# Patient Record
Sex: FEMALE | Race: WHITE | Employment: UNEMPLOYED | URBAN - METROPOLITAN AREA
[De-identification: names, ages, dates, MRNs, and addresses within clinical notes are randomized per-mention and may not be internally consistent; named-entity substitution may affect disease eponyms.]

---

## 2017-11-13 ENCOUNTER — INITIAL PRENATAL (OUTPATIENT)
Dept: MIDWIFE SERVICES | Age: 32
End: 2017-11-13

## 2017-11-13 VITALS
BODY MASS INDEX: 22.59 KG/M2 | DIASTOLIC BLOOD PRESSURE: 81 MMHG | WEIGHT: 132.31 LBS | SYSTOLIC BLOOD PRESSURE: 128 MMHG | HEIGHT: 64 IN | HEART RATE: 106 BPM

## 2017-11-13 DIAGNOSIS — O20.0 THREATENED MISCARRIAGE IN EARLY PREGNANCY: ICD-10-CM

## 2017-11-13 DIAGNOSIS — Z34.01 ENCOUNTER FOR PRENATAL CARE IN FIRST TRIMESTER OF FIRST PREGNANCY: Primary | ICD-10-CM

## 2017-11-13 LAB
BILIRUB UR QL STRIP: NEGATIVE
GLUCOSE UR-MCNC: NEGATIVE MG/DL
HCG URINE, QL. (POC): POSITIVE
KETONES P FAST UR STRIP-MCNC: NEGATIVE MG/DL
PH UR STRIP: 6.5 [PH] (ref 4.6–8)
PROT UR QL STRIP: NEGATIVE
SP GR UR STRIP: 1 (ref 1–1.03)
UA UROBILINOGEN AMB POC: NORMAL (ref 0.2–1)
URINALYSIS CLARITY POC: CLEAR
URINALYSIS COLOR POC: YELLOW
URINE BLOOD POC: NEGATIVE
URINE LEUKOCYTES POC: NEGATIVE
URINE NITRITES POC: NEGATIVE
VALID INTERNAL CONTROL?: YES

## 2017-11-13 NOTE — PROGRESS NOTES
Subjective:     Jeanette Gutierrez is being seen today for her first obstetrical visit. This is a planned pregnancy. Her LMP was 9/5/2017. With an EDC of  6/12/2018  Her obstetrical history is significant for Cholestasis of pregnancy x 2. Her medical history is significant for hearing loss starting age 22, wears bilateral hearing aids x 2. Her current medications include: Prenatal vitamins. She is here today unaccompanied  She is seeking midwifery care  Denies travel to [facility] affected area. Last pap 2/14, due 2/19, since co-testing negative    History fully reviewed- see chart    See patient intake form for complete ROS, scanned in pt chart, under media tab. ROS negative other than c/o rash on back, unable to visualize on exam. Also c/o left lower back pain with urination. No CVAT on exam.    Objective:     Refer to Prenatal Flowsheet and Physical for exam findings. Fundal Height not appreciated. The patient appears well, alert, oriented x 3, in no distress. Visit Vitals    /81    Pulse (!) 106    Ht 5' 4\" (1.626 m)    Wt 132 lb 5 oz (60 kg)    LMP 09/05/2017 (Exact Date)    Breastfeeding No    BMI 22.71 kg/m2     Neck supple. No adenopathy or thyromegaly. Lungs are clear, good air entry, no wheezes, rhonchi or rales. S1 and S2 normal, no murmurs, regular rate and rhythm. Abdomen soft without tenderness, guarding, mass or organomegaly. Extremities show no edema. Neurological is normal, no focal findings.     BREAST EXAM: breasts appear normal, no suspicious masses, no skin or nipple changes or axillary nodes    PELVIC EXAM: normal external genitalia, vulva, vagina, cervix, uterus and adnexa   Pap smear collected  Transvaginal ultrasound    Indication for this exam:  Dating ultrasound and confirmation of viability      Findings  Number of Fetuses: double ring sign  No cardiac flicker   Possible early yolk sac without fetal pole  Maternal ovaries appear normal  No fluid in cul-de-sac  LNMP: 9/5/2017 GA by LMP: 9+5 weeks  CRL: none noted    Summary  Estimated GA: unable to determine   Early pregnancy vs blighted ovum vs missed AB      Sonographer: VIOLETTE Cordero CNM      Assessment:     Early pregnancy vs blighted ovum vs missed AB      Plan:     Labs: New OB lab slip given but will hold pending confirmation of viable pregnancy  Pregnancy test done and positive  Quant hcg now and repeat in 48 hours  Pap current  Continue prenatal vitamins. Genetic screening options reviewed. At this time the patient declines all testing  Midwifery care/philosophy discussed including MD collaboration. Ectopic precautions stressed. Follow-up in 2 weeks for f/u ultrasound if quant HCG rising appropriate suggestive of viable pregnancy. Patient informed we will notify her of HCG results.

## 2017-11-13 NOTE — PROGRESS NOTES
Chief Complaint   Patient presents with    Pregnancy     New pregnancy     Visit Vitals    /81    Pulse (!) 106    Ht 5' 4\" (1.626 m)    Wt 132 lb 5 oz (60 kg)    LMP 09/05/2017 (Exact Date)    Breastfeeding No    BMI 22.71 kg/m2     1. Have you been to the ER, urgent care clinic since your last visit? Hospitalized since your last visit? No    2. Have you seen or consulted any other health care providers outside of the [address] [place] [address] since your last visit? Include any pap smears or colon screening.  No     Verbal order for urine culture and GC/Ct per lizz Cordero CNM

## 2017-11-13 NOTE — PATIENT INSTRUCTIONS
Thank you for choosing [facility]. We know you have many options when it comes to your healthcare; we appreciate that you picked us. Our goal is to provide exceptional service and world class care for every patient. You may receive a survey in the mail or by email asking for your feedback. Please take a few minutes to share your thoughts about your recent visit. Your comments helps us understand what we do well and what we can do better. To ensure confidentiality, this survey is administered by an independent third-party, [address] [place] [address] participation will help us to continue and improve the quality of care that we provide to you, your family, friends, and neighbors. Thank you! Weeks 10 to 14 of Your Pregnancy: Care Instructions  Your Care Instructions    By weeks 10 to 14 of your pregnancy, the placenta has formed inside your uterus. It is possible to hear your baby's heartbeat with a special ultrasound device. Your baby's eyes can and do move. The arms and legs can bend. This is a good time to think about testing for birth defects. There are two types of tests: screening and diagnostic. Screening tests show the chance that a baby has a certain birth defect. They can't tell you for sure that your baby has a problem. Diagnostic tests show if a baby has a certain birth defect. It's your choice whether to have these tests. You and your partner can talk to your doctor or midwife about birth defects tests. Follow-up care is a key part of your treatment and safety. Be sure to make and go to all appointments, and call your doctor if you are having problems. It's also a good idea to know your test results and keep a list of the medicines you take. How can you care for yourself at home? Decide about tests  · You can have screening tests and diagnostic tests to check for birth defects.  The decision to have a test for birth defects is personal. Think about your age, your chance of passing on a family disease, your need to know about any problems, and what you might do after you have the test results. ¨ Triple or quadruple (quad) blood tests. These screening tests can be done between 15 and 20 weeks of pregnancy. They check the amounts of three or four substances in your blood. The doctor looks at these test results, along with your age and other factors, to find out the chance that your baby may have certain problems. ¨ Amniocentesis. This diagnostic test is used to look for chromosomal problems in the baby's cells. It can be done between 15 and 20 weeks of pregnancy, usually around week 16.  ¨ Nuchal translucency test. This test uses ultrasound to measure the thickness of the area at the back of the baby's neck. An increase in the thickness can be an early sign of Down syndrome. ¨ Chorionic villus sampling (CVS). This is a test that looks for certain genetic problems with your baby. The same genes that are in your baby are in the placenta. A small piece of the placenta is taken out and tested. This test is done when you are 10 to 13 weeks pregnant. Ease discomfort  · Slow down and take naps when you feel tired. · If your emotions swing, talk to someone. Crying, anxiety, and concentration problems are common. · If your gums bleed, try a softer toothbrush. If your gums are puffy and bleed a lot, see your dentist.  · If you feel dizzy:  ¨ Get up slowly after sitting or lying down. ¨ Drink plenty of fluids. ¨ Eat small snacks to keep your blood sugar stable. ¨ Put your head between your legs as though you were tying your shoelaces. ¨ Lie down with your legs higher than your head. Use pillows to prop up your feet. · If you have a headache:  ¨ Lie down. ¨ Ask your partner or a good friend for a neck massage. ¨ Try cool cloths over your forehead or across the back of your neck. ¨ Use acetaminophen (Tylenol) for pain relief.  Do not use nonsteroidal anti-inflammatory drugs (NSAIDs), such as ibuprofen (Advil, Motrin) or naproxen (Aleve), unless your doctor says it is okay. · If you have a nosebleed, pinch your nose gently, and hold it for a short while. To prevent nosebleeds, try massaging a small dab of petroleum jelly, such as Vaseline, in your nostrils. · If your nose is stuffed up, try saline (saltwater) nose sprays. Do not use decongestant sprays. Care for your breasts  · Wear a bra that gives you good support. · Know that changes in your breasts are normal.  ¨ Your breasts may get larger and more tender. Tenderness usually gets better by 12 weeks. ¨ Your nipples may get darker and larger, and small bumps around your nipples may show more. ¨ The veins in your chest and breasts may show more. · Don't worry about \"toughening'\" your nipples. Breastfeeding will naturally do this. Where can you learn more? Go to http://mo-mykel.info/. Enter Y746 in the search box to learn more about \"Weeks 10 to 14 of Your Pregnancy: Care Instructions. \"  Current as of: March 16, 2017  Content Version: 11.4  © 2933-6032 [employer]. Care instructions adapted under license by [employer] (which disclaims liability or warranty for this information). If you have questions about a medical condition or this instruction, always ask your healthcare professional. [place] [address] any warranty or liability for your use of this information.

## 2017-11-14 LAB — HCG INTACT+B SERPL-ACNC: NORMAL MIU/ML

## 2017-11-15 DIAGNOSIS — O20.0 THREATENED MISCARRIAGE IN EARLY PREGNANCY: ICD-10-CM

## 2017-11-16 LAB — HCG INTACT+B SERPL-ACNC: NORMAL MIU/ML

## 2017-12-01 ENCOUNTER — TELEPHONE (OUTPATIENT)
Dept: MIDWIFE SERVICES | Age: 32
End: 2017-12-01

## 2017-12-01 NOTE — TELEPHONE ENCOUNTER
Kaity Hunter was informed of ultrasound results completed on yesterday which confirms non-viable intrauterine pregnancy with early gestation with no fetal pole. She has had no cramping or bleeding. Options reviewed and at this time she elects for expectant management and wait to pass the products. She was advised to expect some cramping and bleeding and to go to the ER for significant bleeding. She can take Motrin for the pain. She is Rh positive. She is to call the office if she would like a D and C or other options and for follow up for post SAB care. She verbalized understanding.

## 2017-12-01 NOTE — TELEPHONE ENCOUNTER
Pt had a repeat dating u/s due to no confirmed fetal pole, abnormal rising quants at [facility] yesterday 11/30/2017 and she would like to discuss results before the weekend. Please call.

## 2017-12-12 ENCOUNTER — TELEPHONE (OUTPATIENT)
Dept: MIDWIFE SERVICES | Age: 32
End: 2017-12-12

## 2017-12-12 NOTE — TELEPHONE ENCOUNTER
Pt calling stating she would like to update Mable Cushing on her miscarriage and she would like to discuss a few questions on having a \"natural miscarriage\". Please call.

## 2017-12-13 DIAGNOSIS — O02.1 MISSED ABORTION WITH FETAL DEMISE BEFORE 20 COMPLETED WEEKS OF GESTATION: Primary | ICD-10-CM

## 2017-12-13 RX ORDER — MISOPROSTOL 200 UG/1
TABLET ORAL
Qty: 10 TAB | Refills: 0 | Status: ON HOLD | OUTPATIENT
Start: 2017-12-13 | End: 2017-12-15 | Stop reason: ALTCHOICE

## 2017-12-13 NOTE — TELEPHONE ENCOUNTER
Spoke with Kayleen this morning. Has been having some spotting. Options reviewed and will order Misoprostol 1000 mcg orally x1 to repeat x1 in 24 hours if no POC passed. Offered Percocet for pain and declined. Will take Motrin 800 mg PO for pain PRN.

## 2017-12-13 NOTE — PROGRESS NOTES
Attempted to call Gama Nunez at listed number to return phone call and got voice mail. Message left to have her call me at my cell phone number. Will await call.

## 2017-12-14 ENCOUNTER — APPOINTMENT (OUTPATIENT)
Dept: ULTRASOUND IMAGING | Age: 32
End: 2017-12-14
Attending: EMERGENCY MEDICINE
Payer: SUBSIDIZED

## 2017-12-14 ENCOUNTER — HOSPITAL ENCOUNTER (OUTPATIENT)
Age: 32
Setting detail: OBSERVATION
Discharge: HOME | End: 2017-12-15
Attending: EMERGENCY MEDICINE | Admitting: OBSTETRICS & GYNECOLOGY
Payer: SUBSIDIZED

## 2017-12-14 DIAGNOSIS — R55 SYNCOPE AND COLLAPSE: ICD-10-CM

## 2017-12-14 DIAGNOSIS — O03.9 COMPLETE MISCARRIAGE: Primary | ICD-10-CM

## 2017-12-14 DIAGNOSIS — N93.9 VAGINAL BLEEDING: ICD-10-CM

## 2017-12-14 LAB
BASOPHILS # BLD: 0 K/UL (ref 0–0.1)
BASOPHILS # BLD: 0 K/UL (ref 0–0.1)
BASOPHILS NFR BLD: 0 % (ref 0–1)
BASOPHILS NFR BLD: 0 % (ref 0–1)
EOSINOPHIL # BLD: 0 K/UL (ref 0–0.4)
EOSINOPHIL # BLD: 0.2 K/UL (ref 0–0.4)
EOSINOPHIL NFR BLD: 0 % (ref 0–7)
EOSINOPHIL NFR BLD: 1 % (ref 0–7)
ERYTHROCYTE [DISTWIDTH] IN BLOOD BY AUTOMATED COUNT: 12.8 % (ref 11.5–14.5)
ERYTHROCYTE [DISTWIDTH] IN BLOOD BY AUTOMATED COUNT: 13 % (ref 11.5–14.5)
GLUCOSE BLD STRIP.AUTO-MCNC: 120 MG/DL (ref 65–100)
HCT VFR BLD AUTO: 26.6 % (ref 35–47)
HCT VFR BLD AUTO: 34.6 % (ref 35–47)
HGB BLD-MCNC: 11.9 G/DL (ref 11.5–16)
HGB BLD-MCNC: 8.9 G/DL (ref 11.5–16)
LYMPHOCYTES # BLD: 1.1 K/UL (ref 0.8–3.5)
LYMPHOCYTES # BLD: 2.6 K/UL (ref 0.8–3.5)
LYMPHOCYTES NFR BLD: 22 % (ref 12–49)
LYMPHOCYTES NFR BLD: 9 % (ref 12–49)
MCH RBC QN AUTO: 30 PG (ref 26–34)
MCH RBC QN AUTO: 30.2 PG (ref 26–34)
MCHC RBC AUTO-ENTMCNC: 33.5 G/DL (ref 30–36.5)
MCHC RBC AUTO-ENTMCNC: 34.4 G/DL (ref 30–36.5)
MCV RBC AUTO: 87.2 FL (ref 80–99)
MCV RBC AUTO: 90.2 FL (ref 80–99)
MONOCYTES # BLD: 0.6 K/UL (ref 0–1)
MONOCYTES # BLD: 0.8 K/UL (ref 0–1)
MONOCYTES NFR BLD: 5 % (ref 5–13)
MONOCYTES NFR BLD: 7 % (ref 5–13)
NEUTS SEG # BLD: 10.3 K/UL (ref 1.8–8)
NEUTS SEG # BLD: 8.4 K/UL (ref 1.8–8)
NEUTS SEG NFR BLD: 70 % (ref 32–75)
NEUTS SEG NFR BLD: 86 % (ref 32–75)
PLATELET # BLD AUTO: 178 K/UL (ref 150–400)
PLATELET # BLD AUTO: 274 K/UL (ref 150–400)
RBC # BLD AUTO: 2.95 M/UL (ref 3.8–5.2)
RBC # BLD AUTO: 3.97 M/UL (ref 3.8–5.2)
SERVICE CMNT-IMP: ABNORMAL
WBC # BLD AUTO: 12 K/UL (ref 3.6–11)
WBC # BLD AUTO: 12.1 K/UL (ref 3.6–11)

## 2017-12-14 PROCEDURE — 74011250636 HC RX REV CODE- 250/636: Performed by: EMERGENCY MEDICINE

## 2017-12-14 PROCEDURE — 99218 HC RM OBSERVATION: CPT

## 2017-12-14 PROCEDURE — 85025 COMPLETE CBC W/AUTO DIFF WBC: CPT | Performed by: EMERGENCY MEDICINE

## 2017-12-14 PROCEDURE — 74011250637 HC RX REV CODE- 250/637: Performed by: OBSTETRICS & GYNECOLOGY

## 2017-12-14 PROCEDURE — 99285 EMERGENCY DEPT VISIT HI MDM: CPT

## 2017-12-14 PROCEDURE — 82962 GLUCOSE BLOOD TEST: CPT

## 2017-12-14 PROCEDURE — 96361 HYDRATE IV INFUSION ADD-ON: CPT

## 2017-12-14 PROCEDURE — 36415 COLL VENOUS BLD VENIPUNCTURE: CPT | Performed by: EMERGENCY MEDICINE

## 2017-12-14 PROCEDURE — 76801 OB US < 14 WKS SINGLE FETUS: CPT

## 2017-12-14 PROCEDURE — 96360 HYDRATION IV INFUSION INIT: CPT

## 2017-12-14 PROCEDURE — 76817 TRANSVAGINAL US OBSTETRIC: CPT

## 2017-12-14 RX ORDER — ACETAMINOPHEN 325 MG/1
650 TABLET ORAL
Status: DISCONTINUED | OUTPATIENT
Start: 2017-12-14 | End: 2017-12-15 | Stop reason: HOSPADM

## 2017-12-14 RX ORDER — ZOLPIDEM TARTRATE 5 MG/1
5 TABLET ORAL
Status: DISCONTINUED | OUTPATIENT
Start: 2017-12-14 | End: 2017-12-15 | Stop reason: HOSPADM

## 2017-12-14 RX ORDER — SODIUM CHLORIDE 9 MG/ML
125 INJECTION, SOLUTION INTRAVENOUS CONTINUOUS
Status: DISCONTINUED | OUTPATIENT
Start: 2017-12-14 | End: 2017-12-15 | Stop reason: HOSPADM

## 2017-12-14 RX ORDER — IBUPROFEN 200 MG
400 TABLET ORAL
COMMUNITY
End: 2018-09-12 | Stop reason: ALTCHOICE

## 2017-12-14 RX ADMIN — SODIUM CHLORIDE 125 ML/HR: 900 INJECTION, SOLUTION INTRAVENOUS at 16:42

## 2017-12-14 RX ADMIN — ACETAMINOPHEN 650 MG: 325 TABLET ORAL at 17:00

## 2017-12-14 RX ADMIN — SODIUM CHLORIDE 1000 ML: 900 INJECTION, SOLUTION INTRAVENOUS at 11:16

## 2017-12-14 RX ADMIN — SODIUM CHLORIDE 1000 ML: 900 INJECTION, SOLUTION INTRAVENOUS at 14:02

## 2017-12-14 NOTE — ED NOTES
Pt passed out again and failed orthostatic vital signs. Pt continues to vaginally bleed. Dr. Eric Muhammad told.

## 2017-12-14 NOTE — ED NOTES
Pt given comfort care miscarriage purple folder. Pt resting in room, vs stable as noted. Pt has no new complaints at this time.

## 2017-12-14 NOTE — ED NOTES
Pt resting in room, pt alert and oriented x3, pt reports she is having abdominal cramping at this time, vs satble as noted, pt has no new complaints at this time. Call bell within reach, bed in low position and locked. Pt aware to call the RN on the call bell if needs anything. Pt verbalizes understanding.

## 2017-12-14 NOTE — H&P
Gynecology History and Physical    Name: Kenyatta Hughes MRN: [id number] SSN: [id number]    YOB: 1985  Age: 27 y.o. Sex: female       Subjective:      Chief complaint:  Vaginal bleeding, ARI Jones is a 27 y.o.  female with a history of missed AB/blighted ovum. Took cytotec 1000mcg last PM.  Bleeding started overnight. Heavy bleeding this AM around 0800. Was passing lemon-sized clots.  +cramping. Seen by Dr. Paul Puente in ER - evacuated blood and tissue. Bleeding has decreased significantly since then. US done, no retained POCs (just thickened stripe). Orthostatic with syncopal episode in ER. Sitting up in bed now, feeling a little better. Bleeding has decreased.     OB History      Para Term  AB Living    5 3 3  1 3    SAB TAB Ectopic Molar Multiple Live Births    0     3        Past Medical History:   Diagnosis Date    Cholestasis of pregnancy 2014    Cholestasis of pregnancy 2012    Hearing loss 2004    BILATERAL GENETIC HISTORY     Vaginal delivery 2014     Past Surgical History:   Procedure Laterality Date    HX WISDOM TEETH EXTRACTION      as a teenager    INNER EAR SURGERY [address] UNLISTED      titaneum stapes d/t hearing loss     Social History     Occupational History    stay at home mom      Social History Main Topics    Smoking status: Never Smoker    Smokeless tobacco: Never Used    Alcohol use No    Drug use: No    Sexual activity: Yes     Partners: Male     Family History   Problem Relation Age of Onset    Other Mother      BILATERAL HEARING LOSS    Cancer Father      skin cancer    Dementia Maternal Grandmother     Other Maternal Grandfather      SKIN CA    Other Other      2nd pregnancy at 36 weeks    Breast Cancer Paternal [de-identified]     Other Sister      appendectomy    Colon Cancer Neg Hx     Ovarian Cancer Neg Hx     Diabetes Neg Hx     Heart Disease Neg Hx     Stroke Neg Hx         No Known Allergies  Prior to Admission medications    Medication Sig Start Date End Date Taking? Authorizing Provider   miSOPROStol (CYTOTEC) 200 mcg tablet Take 5 tablets at one time, repeat in 24 hours if no results. 12/13/17   Avila Cordero CNM   prenatal vit-iron fumarate-fa 27-0.8 mg Tab tablet Take 1 Tab by mouth daily. Historical Provider        Review of Systems:  A comprehensive review of systems was negative except for that written in the History of Present Illness. Objective:     Vitals:    12/14/17 1445 12/14/17 1500 12/14/17 1515 12/14/17 1530   BP: 113/54 108/57 112/57 109/57   Pulse:   (!) 101 98   Resp: 15 (!) 31 22 18   Temp:       SpO2: 100% 100% 100% 100%   Weight:       Height:           Physical Exam:  Patient without distress. Heart: S1S2 present or slighlty tachycardic (GU=[id number])  Lung: clear to auscultation throughout lung fields, no wheezes, no rales, no rhonchi and normal respiratory effort  Abdomen: soft, nontender  Scant blood on pad (changed ~15min ago)    Assessment:     SAB with vaginal bleeding. Blood and tissue evacuated, bleeding has decreased. Syncopal episode  hgb 11.9 -> 8.9    Plan:         Feel that she has passed POCs. Will observe for the next few hours. If bleeding not excessive and hemodynamically stable, then OK for discharge home later this evening. Has f/u scheduled 12/26 with Samina Maxwell CNM. I will contact her - if pt doing well can f/u with her or with me. Will f/u with me if any other concerns. Will leave NPO except meds for now.       Signed By:  [address], MD     December 14, 2017

## 2017-12-14 NOTE — ED NOTES
Spoke with Dr. Tavia Martinez and due to patient not having any specific tissue expelled, no record of death form will be filled out or specimen sent to lab. Charge RN Hans Melendez aware of situation.

## 2017-12-14 NOTE — ED NOTES
Pt has been passing blood clots. Dr. Leidy Rosen assessed blood clots and no orders to send any clots to lab at this time.

## 2017-12-14 NOTE — IP AVS SNAPSHOT
Summary of Care Report The Summary of Care report has been created to help improve care coordination. Users with access to [employer] or [address] (Web-based application) may access additional patient information including the Discharge Summary. If you are not currently a [address] user and need more information, please call the number listed below in the [place] [address] section and ask to be connected with Medical Records. Facility Information Name Address Phone [address] [place] [id number] [phone] Patient Information Patient Name Sex  Ravinder Silveira ([id number]) Female 1985 Discharge Information Admitting Provider Service Area Unit Marilyn Abernathy MD / [phone] [place]  [phone] Discharge Provider Discharge Date/Time Discharge Disposition Destination (none) 12/15/2017 (Pending) AHR (none) Patient Language Language ENGLISH [13] Hospital Problems as of 12/15/2017  Reviewed: 2017  2:11 PM by Trey Tierney CNM Class Noted - Resolved Last Modified POA Active Problems Syncope and collapse  2017 - Present 2017 by Junito Sood MD Unknown Entered by Junito Sood MD  
  Vaginal bleeding  2017 - Present 2017 by Junito Sood MD Unknown Entered by Junito Sood MD  
  
Non-Hospital Problems as of 12/15/2017  Reviewed: 2017  2:11 PM by Trey Tierney CNM Class Noted - Resolved Last Modified Active Problems Threatened  in early pregnancy  2017 - Present 2017 by Trey Tierney CNM Entered by Trey Tierney CNM You are allergic to the following No active allergies Current Discharge Medication List  
  
CONTINUE these medications which have NOT CHANGED Dose & Instructions Dispensing Information Comments  
 ibuprofen 200 mg tablet Commonly known as:  MOTRIN Dose:  400 mg Take 400 mg by mouth every six (6) hours as needed for Pain. Refills:  0 STOP taking these medications Comments  
 miSOPROStol 200 mcg tablet Commonly known as:  CYTOTEC Current Immunizations Name Date Influenza Vaccine PF 1/15/2014 Influenza Vaccine Split 10/25/2011 Tdap 1/15/2014 Follow-up Information Follow up With Details Comments Contact Info None   None (395) Patient stated that they have no PCP Discharge Instructions Miscarriage: Care Instructions Your Care Instructions The loss of a pregnancy can be very hard. You may wonder why it happened or blame yourself. Miscarriages are common and are not caused by exercise, stress, or sex. Most happen because the fertilized egg in the uterus does not develop normally. There is no treatment that can stop a miscarriage. As long as you do not have heavy blood loss, fever, weakness, or other signs of infection, you can let a miscarriage follow its own course. This can take several days. Your body will recover over the next several weeks. Having a miscarriage does not mean you cannot have a normal pregnancy in the future. The doctor has checked you carefully, but problems can develop later. If you notice any problems or new symptoms, get medical treatment right away. Follow-up care is a key part of your treatment and safety. Be sure to make and go to all appointments, and call your doctor if you are having problems. It's also a good idea to know your test results and keep a list of the medicines you take. How can you care for yourself at home? · You will probably have some vaginal bleeding for 1 to 2 weeks.  It may be similar to or slightly heavier than a normal period. The bleeding should get lighter after a week. Use pads instead of tampons. You may use tampons during your next period, which should start in 3 to 6 weeks. · Take an over-the-counter pain medicine, such as acetaminophen (Tylenol), ibuprofen (Advil, Motrin), or naproxen (Aleve) for cramps. Read and follow all instructions on the label. You may have cramps for several days after the miscarriage. · Do not take two or more pain medicines at the same time unless the doctor told you to. Many pain medicines have acetaminophen, which is Tylenol. Too much acetaminophen (Tylenol) can be harmful. · Use a clear container to save any tissue that you pass. Take it to your doctor's office as soon as you can. · Do not have sex until the bleeding stops. · You may return to your normal activities if you feel well enough to do so. But you should avoid heavy exercise until the bleeding stops. · If you plan to get pregnant again, check with your doctor. Most doctors suggest waiting until you have had at least one normal period before you try to get pregnant. · If you do not want to get pregnant, ask your doctor about birth control. You can get pregnant again before your next period starts if you are not using birth control. · You may be low in iron because of blood loss. Eat a balanced diet that is high in iron and vitamin C. Foods rich in iron include red meat, shellfish, eggs, beans, and leafy green vegetables. Foods high in vitamin C include citrus fruits, tomatoes, and broccoli. Talk to your doctor about whether you need to take iron pills or a multivitamin. · The loss of a pregnancy can be very hard. You may wonder why it happened and blame yourself. Talking to family members, friends, a counselor, or your doctor may help you cope with your loss. When should you call for help? Call 911 anytime you think you may need emergency care. For example, call if: ? · You passed out (lost consciousness). ?Call your doctor now or seek immediate medical care if: 
? · You have severe vaginal bleeding. ? · You are dizzy or lightheaded, or you feel like you may faint. ? · You have new or worse pain in your belly or pelvis. ? · You have a fever. ? · You have vaginal discharge that smells bad. ? Watch closely for changes in your health, and be sure to contact your doctor if: 
? · You do not get better as expected. Where can you learn more? Go to http://mo-mykel.info/. Enter E802 in the search box to learn more about \"Miscarriage: Care Instructions. \" Current as of: March 16, 2017 Content Version: 11.4 © 1633-4273 [employer]. Care instructions adapted under license by [employer] (which disclaims liability or warranty for this information). If you have questions about a medical condition or this instruction, always ask your healthcare professional. [place] [address] any warranty or liability for your use of this information. Chart Review Routing History No Routing History on File

## 2017-12-14 NOTE — ED TRIAGE NOTES
Pt reports 2 weeks ago she was dx with a miscarriage approx 2 weeks. Reports she took Cytotec last night and started bleeding at midnight last night. Reports she has gone through 2 pads since midnight. Reports she is passing clots.

## 2017-12-14 NOTE — PROGRESS NOTES
[facility]: MED RECONCILIATION    Comments/Recommendations:   · Verified allergies as documented: NKA  · Med rec completed with Mrs. Joe Resendiz who was alert and oriented. · She reported that she was out of town earlier, so she was not taking any vitamins in couple weeks. Medications added:     · Ibuprofen    Medications removed:    · Prenatal vitamins    Medications adjusted:    · None    Information obtained from: Patient, EMR    Allergies: Review of patient's allergies indicates no known allergies. Prior to Admission Medications:   Prior to Admission Medications   Prescriptions Last Dose Informant Patient Reported? Taking?   ibuprofen (MOTRIN) 200 mg tablet 12/14/2017 at 0900 Self Yes Yes   Sig: Take 400 mg by mouth every six (6) hours as needed for Pain.   miSOPROStol (CYTOTEC) 200 mcg tablet 12/13/2017 at Unknown time Self No Yes   Sig: Take 5 tablets at one time, repeat in 24 hours if no results.       Facility-Administered Medications: None         Thank you,  Sia Stiles, PharmD     Contact: [phone]

## 2017-12-14 NOTE — ED NOTES
TRANSFER - OUT REPORT:    Verbal report given to AMAN Wagner on Ellen Louder  being transferred to medical unit       Report consisted of patients Situation, Background, Assessment and   Recommendations(SBAR). Information from the following report(s) SBAR, Kardex, ED Summary, MAR, Recent Results and Med Rec Status was reviewed with the receiving nurse. Lines:   Peripheral IV 12/14/17 Right Forearm (Active)   Site Assessment Clean, dry, & intact 12/14/2017  2:45 PM   Phlebitis Assessment 0 12/14/2017  2:45 PM   Infiltration Assessment 0 12/14/2017  2:45 PM   Dressing Status Clean, dry, & intact 12/14/2017  2:45 PM   Dressing Type Tape;Transparent 12/14/2017  2:45 PM   Hub Color/Line Status Pink;Flushed;Patent 12/14/2017  2:45 PM        Opportunity for questions and clarification was provided.       Patient transported with:   Monitor

## 2017-12-14 NOTE — ED NOTES
Pt had a syncopal episode lasting approx 30 secs while lying in bed. Pt very pale. BP dropped. Pt placed on cardiac monitor. IV fluids infusing. Dr. Romero Brood aware.

## 2017-12-14 NOTE — IP AVS SNAPSHOT
Blain Kehr 
 
 
 [facility] [address] [facility] 
[phone] Patient: Roni Negrete MRN: [id number] :1985 About your hospitalization You were admitted on:  2017 You last received care in the:  [facility] 4M POST SURG ORT 1 You were discharged on:  December 15, 2017 Why you were hospitalized Your primary diagnosis was:  Not on File Your diagnoses also included:  Syncope And Collapse, Vaginal Bleeding Things You Need To Do (next 8 weeks) Follow up with None Where:  None (395) Patient stated that they have no PCP Tuesday Dec 26, 2017 ESTABLISHED PATIENT with Amarjit Gomez CNM at  4:00 PM  
Where:  [id number] [facility] [address] [facility]) Discharge Orders None A check rosemary indicates which time of day the medication should be taken. My Medications STOP taking these medications   
 miSOPROStol 200 mcg tablet Commonly known as:  CYTOTEC  
   
  
  
TAKE these medications as instructed Instructions Each Dose to Equal  
 Morning Noon Evening Bedtime  
 ibuprofen 200 mg tablet Commonly known as:  MOTRIN Your last dose was: Your next dose is: Take 400 mg by mouth every six (6) hours as needed for Pain. 400 mg Discharge Instructions Miscarriage: Care Instructions Your Care Instructions The loss of a pregnancy can be very hard. You may wonder why it happened or blame yourself. Miscarriages are common and are not caused by exercise, stress, or sex. Most happen because the fertilized egg in the uterus does not develop normally. There is no treatment that can stop a miscarriage. As long as you do not have heavy blood loss, fever, weakness, or other signs of infection, you can let a miscarriage follow its own course. This can take several days. Your body will recover over the next several weeks. Having a miscarriage does not mean you cannot have a normal pregnancy in the future. The doctor has checked you carefully, but problems can develop later. If you notice any problems or new symptoms, get medical treatment right away. Follow-up care is a key part of your treatment and safety. Be sure to make and go to all appointments, and call your doctor if you are having problems. It's also a good idea to know your test results and keep a list of the medicines you take. How can you care for yourself at home? · You will probably have some vaginal bleeding for 1 to 2 weeks. It may be similar to or slightly heavier than a normal period. The bleeding should get lighter after a week. Use pads instead of tampons. You may use tampons during your next period, which should start in 3 to 6 weeks. · Take an over-the-counter pain medicine, such as acetaminophen (Tylenol), ibuprofen (Advil, Motrin), or naproxen (Aleve) for cramps. Read and follow all instructions on the label. You may have cramps for several days after the miscarriage. · Do not take two or more pain medicines at the same time unless the doctor told you to. Many pain medicines have acetaminophen, which is Tylenol. Too much acetaminophen (Tylenol) can be harmful. · Use a clear container to save any tissue that you pass. Take it to your doctor's office as soon as you can. · Do not have sex until the bleeding stops. · You may return to your normal activities if you feel well enough to do so. But you should avoid heavy exercise until the bleeding stops. · If you plan to get pregnant again, check with your doctor. Most doctors suggest waiting until you have had at least one normal period before you try to get pregnant. · If you do not want to get pregnant, ask your doctor about birth control. You can get pregnant again before your next period starts if you are not using birth control. · You may be low in iron because of blood loss. Eat a balanced diet that is high in iron and vitamin C. Foods rich in iron include red meat, shellfish, eggs, beans, and leafy green vegetables. Foods high in vitamin C include citrus fruits, tomatoes, and broccoli. Talk to your doctor about whether you need to take iron pills or a multivitamin. · The loss of a pregnancy can be very hard. You may wonder why it happened and blame yourself. Talking to family members, friends, a counselor, or your doctor may help you cope with your loss. When should you call for help? Call 911 anytime you think you may need emergency care. For example, call if: 
? · You passed out (lost consciousness). ?Call your doctor now or seek immediate medical care if: 
? · You have severe vaginal bleeding. ? · You are dizzy or lightheaded, or you feel like you may faint. ? · You have new or worse pain in your belly or pelvis. ? · You have a fever. ? · You have vaginal discharge that smells bad. ? Watch closely for changes in your health, and be sure to contact your doctor if: 
? · You do not get better as expected. Where can you learn more? Go to http://mo-mykel.info/. Enter E802 in the search box to learn more about \"Miscarriage: Care Instructions. \" Current as of: March 16, 2017 Content Version: 11.4 © 6376-8513 [employer]. Care instructions adapted under license by [employer] (which disclaims liability or warranty for this information). If you have questions about a medical condition or this instruction, always ask your healthcare professional. [address] any warranty or liability for your use of this information. [place] Announcement We are excited to announce that we are making your provider's discharge notes available to you in [email or web address].   You will see these notes when they are completed and signed by the physician that discharged you from your recent hospital stay. If you have any questions or concerns about any information you see in Insane Logic, please call the Health Information Department where you were seen or reach out to your Primary Care Provider for more information about your plan of care. Introducing [facility]! Dear Ashlee Treviño: Thank you for requesting a Insane Logic account. Our records indicate that you already have an active Insane Logic account. You can access your account anytime at [email or web address] Did you know that you can access your hospital and ER discharge instructions at any time in Insane Logic? You can also review all of your test results from your hospital stay or ER visit. Additional Information If you have questions, please visit the Frequently Asked Questions section of the Insane Logic website at [email or web address] Remember, Insane Logic is NOT to be used for urgent needs. For medical emergencies, dial 911. Now available from your iPhone and Android! Providers Seen During Your Hospitalization Provider Specialty Primary office phone Heath Seip, MD Emergency Medicine [phone] Ira Bird MD Obstetrics & Gynecology [phone] Your Primary Care Physician (PCP) Primary Care Physician Office Phone Office Fax NONE ** None ** ** None ** You are allergic to the following No active allergies Recent Documentation Height Weight Breastfeeding? BMI OB Status Smoking Status 1.6 m 56.7 kg No 22.14 kg/m2 Pregnant Never Smoker Emergency Contacts Name Discharge Info [facility]ation [facility] CARE[facility] [3] Spouse [3] [phone] Patient Belongings  The following personal items are in your possession at time of discharge: 
  Dental Appliances: None  Visual Aid: Glasses, Contacts   Hearing Aids/Status: Left  Home Medications: None   Jewelry: Ring, Necklace  Clothing: Pajamas, Shirt, Undergarments, Footwear    Other Valuables: Cell Phone Please provide this summary of care documentation to your next provider. Signatures-by signing, you are acknowledging that this After Visit Summary has been reviewed with you and you have received a copy. Patient Signature:  ____________________________________________________________ Date:  ____________________________________________________________  
  
[facility] Provider Signature:  ____________________________________________________________ Date:  ____________________________________________________________

## 2017-12-14 NOTE — ED PROVIDER NOTES
Patient is a 27 y.o. female presenting with miscarriage. The history is provided by the patient. Miscarriage    This is a new problem. The current episode started more than 1 week ago. The problem has been rapidly worsening. The patient is experiencing no pain. Pertinent negatives include no fever, no nausea, no vomiting, no dysuria, no chest pain and no back pain. The pain is relieved by nothing. The patient's surgical history non-contributory. patient with missed  - her OB started her on cytotec last night and she has had increasing vaginal bleeding and now passing large clots frequently.     Past Medical History:   Diagnosis Date    Cholestasis of pregnancy 2014    Cholestasis of pregnancy 2012    Hearing loss 2004    BILATERAL GENETIC HISTORY     Vaginal delivery 2014       Past Surgical History:   Procedure Laterality Date    HX WISDOM TEETH EXTRACTION      as a teenager    INNER EAR SURGERY [address] UNLISTED      titaneum stapes d/t hearing loss         Family History:   Problem Relation Age of Onset    Other Mother      BILATERAL HEARING LOSS    Cancer Father      skin cancer    Dementia Maternal Grandmother     Other Maternal Grandfather      SKIN CA    Other Other      2nd pregnancy at 36 weeks    Breast Cancer Paternal Aunt     Other Sister      appendectomy    Colon Cancer Neg Hx     Ovarian Cancer Neg Hx     Diabetes Neg Hx     Heart Disease Neg Hx     Stroke Neg Hx        Social History     Social History    Marital status:      Spouse name: N/A    Number of children: N/A    Years of education: N/A     Occupational History    stay at home mom      Social History Main Topics    Smoking status: Never Smoker    Smokeless tobacco: Never Used    Alcohol use No    Drug use: No    Sexual activity: Yes     Partners: Male     Other Topics Concern     Service No    Seat Belt Yes    Self-Exams Yes     Social History Narrative         ALLERGIES: Review of patient's allergies indicates no known allergies. Review of Systems   Constitutional: Negative for chills and fever. Cardiovascular: Negative for chest pain. Gastrointestinal: Negative for abdominal pain, nausea and vomiting. Genitourinary: Positive for vaginal bleeding. Negative for dysuria. Musculoskeletal: Negative for back pain and neck pain. Neurological: Negative for dizziness and light-headedness. Psychiatric/Behavioral: The patient is nervous/anxious. Tearful   All other systems reviewed and are negative. Vitals:    12/14/17 1048 12/14/17 1052 12/14/17 1055   BP: 112/85 112/85    Pulse: (!) 150     Resp: 16     Temp: 98.8 °F (37.1 °C)     SpO2: 98% 98% 98%   Weight: 56.7 kg (125 lb)     Height: 5' 3\" (1.6 m)              Physical Exam   Constitutional: She is oriented to person, place, and time. She appears well-developed and well-nourished. She appears distressed. HENT:   Head: Normocephalic and atraumatic. Mouth/Throat: Oropharynx is clear and moist.   Eyes: EOM are normal.   Cardiovascular: Regular rhythm, normal heart sounds and intact distal pulses. Tachycardia present. No murmur heard. Pulmonary/Chest: Effort normal and breath sounds normal. No respiratory distress. Abdominal: Soft. Bowel sounds are normal. There is no tenderness. Genitourinary:   Genitourinary Comments: See procedure note   Musculoskeletal: Normal range of motion. She exhibits no edema or deformity. Neurological: She is alert and oriented to person, place, and time. No cranial nerve deficit. Skin: She is not diaphoretic. Psychiatric: Her mood appears anxious. tearful   Nursing note and vitals reviewed. MDM  Number of Diagnoses or Management Options  Complete miscarriage:   Syncope and collapse:   Vaginal bleeding:   Diagnosis management comments: Increased vaginal bleeding - concern for incomplete miscarriage with retained POC - get US and review.   Check labs, give IVF and re-eval.    1350 - patient reportedly had a syncopal event prior to IVF and then again just now while attempting orthostatics, will consult OB to admit. Still with some bleeding, but much less than on arrival           Amount and/or Complexity of Data Reviewed  Clinical lab tests: ordered and reviewed  Tests in the radiology section of CPT®: ordered and reviewed  Discuss the patient with other providers: yes (Her OB midwife and Dr. Danelle Mack)      ED Course       Pelvic Exam  Date/Time: 12/14/2017 11:14 AM  Performed by: attending  Procedure duration:  5 minutes. Documented by:  Dr. Grace Desai MD.   Exam assisted by: OSCAR Agee. Type of exam performed: speculum. External genitalia appearance: normal.    Vaginal exam:  bleeding. Bleeding: copious and pooling  Cervical exam:  os open, tissue seen from os and evidence of products of conception (possible yolk sac - broke during removal). Specimen(s) collected:  none.   Patient tolerance: Patient tolerated the procedure well with no immediate complications

## 2017-12-15 VITALS
BODY MASS INDEX: 22.15 KG/M2 | DIASTOLIC BLOOD PRESSURE: 54 MMHG | HEIGHT: 63 IN | SYSTOLIC BLOOD PRESSURE: 117 MMHG | OXYGEN SATURATION: 99 % | TEMPERATURE: 98.5 F | HEART RATE: 102 BPM | WEIGHT: 125 LBS | RESPIRATION RATE: 16 BRPM

## 2017-12-15 PROCEDURE — 74011250636 HC RX REV CODE- 250/636: Performed by: EMERGENCY MEDICINE

## 2017-12-15 PROCEDURE — 99218 HC RM OBSERVATION: CPT

## 2017-12-15 RX ADMIN — SODIUM CHLORIDE 125 ML/HR: 900 INJECTION, SOLUTION INTRAVENOUS at 01:42

## 2017-12-15 NOTE — PROGRESS NOTES
Problem: Falls - Risk of  Goal: *Absence of Falls  Document Teodoro Fall Risk and appropriate interventions in the flowsheet.    Outcome: Progressing Towards Goal  Fall Risk Interventions:  Mobility Interventions: Bed/chair exit alarm, Patient to call before getting OOB         Medication Interventions: Bed/chair exit alarm, Patient to call before getting OOB, Teach patient to arise slowly    Elimination Interventions: Bed/chair exit alarm, Call light in reach, Patient to call for help with toileting needs

## 2017-12-15 NOTE — DISCHARGE SUMMARY
Gynecology Discharge Summary     Patient ID:  Matilda Momin  [id number]  67 y.o.  1985    Admit date: 2017    Discharge date and time: No discharge date for patient encounter. Admission Diagnoses:    Patient Active Problem List   Diagnosis Code    Threatened  in early pregnancy O20.0    Syncope and collapse R55    Vaginal bleeding N93.9       Discharge Diagnoses: There are no discharge diagnoses documented for the most recent discharge. Patient Active Problem List   Diagnosis Code    Threatened  in early pregnancy O20.0    Syncope and collapse R55    Vaginal bleeding N93.9       Procedures for this admission:     Hospital Course: admitted to observation following syncopal episode in ER. Bleeding decreased. Ambulating without dizziness. OK for discharge home    Disposition: Home or self care    Discharged Condition: good            Patient Instructions:   Current Discharge Medication List      CONTINUE these medications which have NOT CHANGED    Details   ibuprofen (MOTRIN) 200 mg tablet Take 400 mg by mouth every six (6) hours as needed for Pain. STOP taking these medications       miSOPROStol (CYTOTEC) 200 mcg tablet Comments:   Reason for Stopping:             Activity: Activity as tolerated and pelvic rest - nothing in vagina, no intercourse, no tub baths until bleeding stops. Diet: Regular Diet    Follow-up with Perri Gorman CNM 17 as scheuduled.     Signed:  [address], MD  12/15/2017  8:03 AM

## 2017-12-15 NOTE — PROGRESS NOTES
Bedside and Verbal shift change report given to Lauri Rosario RN (oncoming nurse) by Leo Schwab RN (offgoing nurse). Report included the following information SBAR, Kardex, Procedure Summary, Intake/Output, MAR and Recent Results. Primary Nurse Arie Antonio RN and Lauri Rosario RN performed a dual skin assessment on this patient. No impairment noted. Cesar score is 22. Pt tolerating ambulation but tachycardic to the 110s when standing. Has changed pad x 3 since admission to the floor. Bleeding decreasing. Discussed with Dr. Matthew Prasad. Pt may have a regular diet but will stay overnight for continued IV fluids and monitoring of bleeding. Telephone order with read back also received for 5mg of PO ambien (may be repeated once if needed) for sleep.

## 2017-12-15 NOTE — DISCHARGE INSTRUCTIONS
Miscarriage: Care Instructions  Your Care Instructions    The loss of a pregnancy can be very hard. You may wonder why it happened or blame yourself. Miscarriages are common and are not caused by exercise, stress, or sex. Most happen because the fertilized egg in the uterus does not develop normally. There is no treatment that can stop a miscarriage. As long as you do not have heavy blood loss, fever, weakness, or other signs of infection, you can let a miscarriage follow its own course. This can take several days. Your body will recover over the next several weeks. Having a miscarriage does not mean you cannot have a normal pregnancy in the future. The doctor has checked you carefully, but problems can develop later. If you notice any problems or new symptoms, get medical treatment right away. Follow-up care is a key part of your treatment and safety. Be sure to make and go to all appointments, and call your doctor if you are having problems. It's also a good idea to know your test results and keep a list of the medicines you take. How can you care for yourself at home? · You will probably have some vaginal bleeding for 1 to 2 weeks. It may be similar to or slightly heavier than a normal period. The bleeding should get lighter after a week. Use pads instead of tampons. You may use tampons during your next period, which should start in 3 to 6 weeks. · Take an over-the-counter pain medicine, such as acetaminophen (Tylenol), ibuprofen (Advil, Motrin), or naproxen (Aleve) for cramps. Read and follow all instructions on the label. You may have cramps for several days after the miscarriage. · Do not take two or more pain medicines at the same time unless the doctor told you to. Many pain medicines have acetaminophen, which is Tylenol. Too much acetaminophen (Tylenol) can be harmful. · Use a clear container to save any tissue that you pass. Take it to your doctor's office as soon as you can.   · Do not have sex until the bleeding stops. · You may return to your normal activities if you feel well enough to do so. But you should avoid heavy exercise until the bleeding stops. · If you plan to get pregnant again, check with your doctor. Most doctors suggest waiting until you have had at least one normal period before you try to get pregnant. · If you do not want to get pregnant, ask your doctor about birth control. You can get pregnant again before your next period starts if you are not using birth control. · You may be low in iron because of blood loss. Eat a balanced diet that is high in iron and vitamin C. Foods rich in iron include red meat, shellfish, eggs, beans, and leafy green vegetables. Foods high in vitamin C include citrus fruits, tomatoes, and broccoli. Talk to your doctor about whether you need to take iron pills or a multivitamin. · The loss of a pregnancy can be very hard. You may wonder why it happened and blame yourself. Talking to family members, friends, a counselor, or your doctor may help you cope with your loss. When should you call for help? Call 911 anytime you think you may need emergency care. For example, call if:  ? · You passed out (lost consciousness). ?Call your doctor now or seek immediate medical care if:  ? · You have severe vaginal bleeding. ? · You are dizzy or lightheaded, or you feel like you may faint. ? · You have new or worse pain in your belly or pelvis. ? · You have a fever. ? · You have vaginal discharge that smells bad. ? Watch closely for changes in your health, and be sure to contact your doctor if:  ? · You do not get better as expected. Where can you learn more? Go to http://mo-mykel.info/. Enter E802 in the search box to learn more about \"Miscarriage: Care Instructions. \"  Current as of: March 16, 2017  Content Version: 11.4  © 3213-0097 [employer], [employer].  Care instructions adapted under license by [employer] (which disclaims liability or warranty for this information). If you have questions about a medical condition or this instruction, always ask your healthcare professional. [address] any warranty or liability for your use of this information.

## 2017-12-15 NOTE — PROGRESS NOTES
12/15/2017 11:22 AM Met with pt. Charted address and phone number confirmed. Obs letter was given. Pt is uninsured but reported she has been approved for [employer]IS. Goodrx card and care card application provided to pt. PCP list provided to pt. No discharge needs identified. Pt discharged home.  CAREN Prakash   Care Management Interventions  Current Support Network: Lives with Spouse, Own Home

## 2017-12-15 NOTE — ROUTINE PROCESS
Bedside and Verbal shift change report given to Crescencio Michaels RN (oncoming nurse) by Parvez Meza RN (offgoing nurse). Report included the following information SBAR, Kardex, ED Summary, Procedure Summary, Intake/Output, MAR and Recent Results.

## 2017-12-15 NOTE — PROGRESS NOTES
Gynecology Progress Note    David Menendez    Subjective:  She is without significant complaints. Light bleeding overnight, has only used one pad. Has been up to BR without dizziness.         Vitals:  Visit Vitals    /54 (BP 1 Location: Left arm, BP Patient Position: At rest)    Pulse (!) 102    Temp 98.5 °F (36.9 °C)    Resp 16    Ht 5' 3\" (1.6 m)    Wt 125 lb (56.7 kg)    LMP 2017 (Exact Date)    SpO2 99%    Breastfeeding No    BMI 22.14 kg/m2     Temp (24hrs), Av.6 °F (37 °C), Min:98.4 °F (36.9 °C), Max:98.9 °F (37.2 °C)      Last 24hr Input/Output:    Intake/Output Summary (Last 24 hours) at 12/15/17 0756  Last data filed at 12/15/17 0600   Gross per 24 hour   Intake           191.67 ml   Output             2700 ml   Net         -2508.33 ml          Exam:  General: alert, cooperative, no distress     Lung: clear to auscultation bilaterally     Heart: regular rate and rhythm, S1, S2 normal, no murmur, click, rub or gallop     Abdomen: abdomen is soft without significant tenderness, masses, organomegaly or guarding     Extremities: no edema, no palp cords      Labs: Lab Results   Component Value Date/Time    WBC 12.1 2017 02:26 PM    WBC 12.0 2017 11:02 AM    WBC 8.6 10/25/2013 11:20 AM    WBC 6.4 2013 10:19 AM    WBC 6.7 2012 12:15 PM    WBC 8.4 10/25/2011 02:30 PM    WBC 7.5 2011 02:39 PM    HGB 8.9 2017 02:26 PM    HGB 11.9 2017 11:02 AM    HGB 11.2 10/25/2013 11:20 AM    HGB 12.3 2013 10:19 AM    HGB 11.0 2012 12:15 PM    HGB 11.5 10/25/2011 02:30 PM    HGB 13.0 2011 02:39 PM    HCT 26.6 2017 02:26 PM    HCT 34.6 2017 11:02 AM    HCT 33.2 10/25/2013 11:20 AM    HCT 36.6 2013 10:19 AM    HCT 32.9 2012 12:15 PM    HCT 34.4 10/25/2011 02:30 PM    HCT 39.0 2011 02:39 PM    PLATELET 779  02:26 PM    PLATELET 983  11:02 AM    PLATELET 662  11:20 AM    PLATELET 015 07/09/2013 10:19 AM    PLATELET 347 41/78/4186 12:15 PM    PLATELET 149 75/18/3517 02:30 PM    PLATELET 931 57/55/4672 02:39 PM    Hgb, External 11.2 g/dL  -  WNL 10/26/2013    Hgb, External 12.3  g/dL - WNL 07/11/2013    Hgb, External 11.5 10/27/2011 11:19 AM    Hgb, External 11.5 10/27/2011    Hct, External 33.2  %  -  LOW 10/26/2013    Hct, External 36.6  %  -  WNL 07/11/2013    Hct, External 34.4 10/27/2011 11:19 AM    Hct, External 34.4 10/27/2011    Platelet cnt., External 275 x10E3/uL  -  WNL 10/26/2013    Platelet cnt., External 240  x10E3/uL  -  WNL 07/11/2013    Platelet cnt., External 252 10/27/2011 11:19 AM    Platelet cnt., External 252 10/27/2011       Recent Results (from the past 24 hour(s))   CBC WITH AUTOMATED DIFF    Collection Time: 12/14/17 11:02 AM   Result Value Ref Range    WBC 12.0 (H) 3.6 - 11.0 K/uL    RBC 3.97 3.80 - 5.20 M/uL    HGB 11.9 11.5 - 16.0 g/dL    HCT 34.6 (L) 35.0 - 47.0 %    MCV 87.2 80.0 - 99.0 FL    MCH 30.0 26.0 - 34.0 PG    MCHC 34.4 30.0 - 36.5 g/dL    RDW 12.8 11.5 - 14.5 %    PLATELET 846 672 - 344 K/uL    NEUTROPHILS 70 32 - 75 %    LYMPHOCYTES 22 12 - 49 %    MONOCYTES 7 5 - 13 %    EOSINOPHILS 1 0 - 7 %    BASOPHILS 0 0 - 1 %    ABS. NEUTROPHILS 8.4 (H) 1.8 - 8.0 K/UL    ABS. LYMPHOCYTES 2.6 0.8 - 3.5 K/UL    ABS. MONOCYTES 0.8 0.0 - 1.0 K/UL    ABS. EOSINOPHILS 0.2 0.0 - 0.4 K/UL    ABS.  BASOPHILS 0.0 0.0 - 0.1 K/UL   GLUCOSE, POC    Collection Time: 12/14/17 12:38 PM   Result Value Ref Range    Glucose (POC) 120 (H) 65 - 100 mg/dL    Performed by Tyshawn Tatum    CBC WITH AUTOMATED DIFF    Collection Time: 12/14/17  2:26 PM   Result Value Ref Range    WBC 12.1 (H) 3.6 - 11.0 K/uL    RBC 2.95 (L) 3.80 - 5.20 M/uL    HGB 8.9 (L) 11.5 - 16.0 g/dL    HCT 26.6 (L) 35.0 - 47.0 %    MCV 90.2 80.0 - 99.0 FL    MCH 30.2 26.0 - 34.0 PG    MCHC 33.5 30.0 - 36.5 g/dL    RDW 13.0 11.5 - 14.5 %    PLATELET 921 251 - 640 K/uL    NEUTROPHILS 86 (H) 32 - 75 %    LYMPHOCYTES 9 (L) 12 - 49 %    MONOCYTES 5 5 - 13 %    EOSINOPHILS 0 0 - 7 %    BASOPHILS 0 0 - 1 %    ABS. NEUTROPHILS 10.3 (H) 1.8 - 8.0 K/UL    ABS. LYMPHOCYTES 1.1 0.8 - 3.5 K/UL    ABS. MONOCYTES 0.6 0.0 - 1.0 K/UL    ABS. EOSINOPHILS 0.0 0.0 - 0.4 K/UL    ABS. BASOPHILS 0.0 0.0 - 0.1 K/UL         Assesment: S/P SAB. Had significant bleeding with syncope. Bleeding now light. Hemodynamically stable, ambulating without dizziness.     Plan:   - OK for discharge home  - has f/u scheduled with Arnoldo Yoder CNM  - pelvic rest, nothing per vagina until bleeding stops  - rec OTC iron supplement

## 2017-12-19 ENCOUNTER — TELEPHONE (OUTPATIENT)
Dept: MIDWIFE SERVICES | Age: 32
End: 2017-12-19

## 2017-12-19 NOTE — TELEPHONE ENCOUNTER
Called to see how Liv Jones was doing and having occasional episodes of dizziness. Will start taking Floridex for anemia BID and increase oral fluids. Has taken oral iron once or twice and spotting blood which smelled metallic.  I will follow up with her in the office on 12/26

## 2017-12-26 ENCOUNTER — OFFICE VISIT (OUTPATIENT)
Dept: MIDWIFE SERVICES | Age: 32
End: 2017-12-26

## 2017-12-26 VITALS
DIASTOLIC BLOOD PRESSURE: 66 MMHG | BODY MASS INDEX: 23.09 KG/M2 | HEIGHT: 63 IN | WEIGHT: 130.31 LBS | SYSTOLIC BLOOD PRESSURE: 116 MMHG | HEART RATE: 97 BPM

## 2017-12-26 DIAGNOSIS — O03.9 COMPLETE MISCARRIAGE: Primary | ICD-10-CM

## 2017-12-26 PROBLEM — N93.9 VAGINAL BLEEDING: Status: RESOLVED | Noted: 2017-12-14 | Resolved: 2017-12-26

## 2017-12-26 PROBLEM — R55 SYNCOPE AND COLLAPSE: Status: RESOLVED | Noted: 2017-12-14 | Resolved: 2017-12-26

## 2017-12-26 PROBLEM — O20.0 THREATENED ABORTION IN EARLY PREGNANCY: Status: RESOLVED | Noted: 2017-11-13 | Resolved: 2017-12-26

## 2017-12-26 RX ORDER — LANOLIN ALCOHOL/MO/W.PET/CERES
CREAM (GRAM) TOPICAL
COMMUNITY
End: 2018-09-12 | Stop reason: ALTCHOICE

## 2017-12-26 NOTE — PROGRESS NOTES
Chief Complaint   Patient presents with    Follow-up     miscarriage     Visit Vitals    /66    Pulse 97    Ht 5' 3\" (1.6 m)    Wt 130 lb 5 oz (59.1 kg)    LMP 09/05/2017 (Exact Date)    BMI 23.08 kg/m2     1. Have you been to the ER, urgent care clinic since your last visit? Hospitalized since your last visit? Yes Went to [facility] ER on 12/14 for misab    2. Have you seen or consulted any other health care providers outside of the [address] [place] [address] since your last visit? Include any pap smears or colon screening.  No

## 2017-12-26 NOTE — PATIENT INSTRUCTIONS
Thank you for choosing [facility]. We know you have many options when it comes to your healthcare; we appreciate that you picked us. Our goal is to provide exceptional service and world class care for every patient. You may receive a survey in the mail or by email asking for your feedback. Please take a few minutes to share your thoughts about your recent visit. Your comments helps us understand what we do well and what we can do better. To ensure confidentiality, this survey is administered by an independent third-party, [address] [place] [address] participation will help us to continue and improve the quality of care that we provide to you, your family, friends, and neighbors. Thank you!

## 2017-12-26 NOTE — PROGRESS NOTES
Terrance Bonner presents for f/u s/p complete SAB after misoprostol for Missed AB. She had 24 hour observation for light headed and dizziness following hemorrage. She did not require a transfusion. Her bleeding remained scant with mucous after discharge is spotting now. Denies any passage of tissue. Last H/H  8.9 26. 6 on 12/14. No longer symptomatic except for  occasional palpitations. Taking floradix for iron supplement. Other iron rich sources reviewed. Doing well emotionally. Desires another pregnancy. PNV encouraged      1.  Complete miscarriage      Orders Placed This Encounter    BETA-HCG, QT, SERIAL    ferrous sulfate (IRON) 325 mg (65 mg iron) tablet

## 2017-12-27 LAB — HCG INTACT+B SERPL-ACNC: 416 MIU/ML

## 2017-12-28 DIAGNOSIS — O03.9 COMPLETE MISCARRIAGE: Primary | ICD-10-CM

## 2017-12-28 NOTE — PROGRESS NOTES
Reviewed results over the phone. Will repeat HcG next week. Order faxed to [facility] [address].

## 2018-01-04 DIAGNOSIS — O03.9 COMPLETE MISCARRIAGE: ICD-10-CM

## 2018-01-04 LAB — HCG INTACT+B SERPL-ACNC: 95 MIU/ML

## 2018-02-05 ENCOUNTER — TELEPHONE (OUTPATIENT)
Dept: MIDWIFE SERVICES | Age: 33
End: 2018-02-05

## 2018-02-05 NOTE — TELEPHONE ENCOUNTER
Pt experiencing a long drawn out miscarriage and would like to update Arturo Aguilar or Ramona Nelson on how things are going. Please call.

## 2018-02-06 NOTE — TELEPHONE ENCOUNTER
Pt called regarding continued bleeding or spotting every day since her miscarriage in mid-December. Chart reviewed and last Bhcg was 95. Pt did not f/u with next BHCG due to financial concerns. However, she is willing to follow up now with Santosh Guaman or Stevan Gotti for exam and f/u blood work prn.

## 2018-02-12 ENCOUNTER — HOSPITAL ENCOUNTER (OUTPATIENT)
Dept: LAB | Age: 33
Discharge: HOME | End: 2018-02-12

## 2018-02-12 ENCOUNTER — OFFICE VISIT (OUTPATIENT)
Dept: MIDWIFE SERVICES | Age: 33
End: 2018-02-12

## 2018-02-12 VITALS
SYSTOLIC BLOOD PRESSURE: 131 MMHG | HEIGHT: 63 IN | BODY MASS INDEX: 23.27 KG/M2 | DIASTOLIC BLOOD PRESSURE: 89 MMHG | WEIGHT: 131.31 LBS | HEART RATE: 102 BPM

## 2018-02-12 DIAGNOSIS — O03.9 COMPLETE MISCARRIAGE: Primary | ICD-10-CM

## 2018-02-12 NOTE — PROGRESS NOTES
Verbal order for poss POC, brought in by patient from home, to be sent to pathology per ENIO Velazco

## 2018-02-12 NOTE — MR AVS SNAPSHOT
[address] [facility] [address]. [facility] [address] [address] 
[phone] Patient: Tanesha Ellis MRN: [id number] :[id number] Visit Information Date & Time Provider Department Dept. Phone Encounter #  
 2018 11:30 AM [facility] [address] [id number] Upcoming Health Maintenance Date Due  
 PAP AKA CERVICAL CYTOLOGY 2017 Influenza Age 5 to Adult 2017 Allergies as of 2018  Review Complete On: 2018 By: Martha Quintana LPN No Known Allergies Current Immunizations  Reviewed on 1/15/2014 Name Date Influenza Vaccine PF 1/15/2014 10:14 AM  
 Influenza Vaccine Split 10/25/2011 Tdap 1/15/2014 10:30 AM  
  
 Not reviewed this visit You Were Diagnosed With   
  
 Codes Comments Complete miscarriage    -  Primary ICD-10-CM: O03.9 ICD-9-CM: 634.92 Vitals BP Pulse Height(growth percentile) Weight(growth percentile) LMP Breastfeeding? 131/89 (!) 102 5' 3\" (1.6 m) 131 lb 5 oz (59.6 kg) 02/10/2018 No  
 BMI OB Status Smoking Status 23.26 kg/m2 Recent pregnancy Never Smoker BMI and BSA Data Body Mass Index Body Surface Area  
 23.26 kg/m 2 1.63 m 2 Preferred Pharmacy Pharmacy Name Phone [facility], [address] [place] [phone] Your Updated Medication List  
  
   
This list is accurate as of: 18 12:13 PM.  Always use your most recent med list.  
  
  
  
  
 ibuprofen 200 mg tablet Commonly known as:  MOTRIN Take 400 mg by mouth every six (6) hours as needed for Pain. Iron 325 mg (65 mg iron) tablet Generic drug:  ferrous sulfate Take  by mouth Daily (before breakfast). OTHER Indications: Floradix PNV No12-Iron-FA-DSS-OM-3 29 mg iron-1 mg -50 mg Cpkd Take  by mouth. We Performed the Following BETA HCG, QT [id number] CPT(R)] CBC W/O DIFF [48278 CPT(R)] Patient Instructions Thank you for choosing [address] [place] [address]. We know you have many options when it comes to your healthcare; we appreciate that you picked us. Our goal is to provide exceptional service and world class care for every patient. You may receive a survey in the mail or by email asking for your feedback. Please take a few minutes to share your thoughts about your recent visit. Your comments helps us understand what we do well and what we can do better. To ensure confidentiality, this survey is administered by an independent third-party, [employer] [facility] participation will help us to continue and improve the quality of care that we provide to you, your family, friends, and neighbors. Thank you! Introducing [facility]! Dear Maria Del Carmen Solorio: Thank you for requesting a "[employer] account. Our records indicate that you already have an active "[employer] account. You can access your account anytime at [email or web address] Did you know that you can access your hospital and ER discharge instructions at any time in "[employer]? You can also review all of your test results from your hospital stay or ER visit. Additional Information If you have questions, please visit the Frequently Asked Questions section of the "[employer] website at [email or web address] Remember, "[employer] is NOT to be used for urgent needs. For medical emergencies, dial 911. Now available from your iPhone and Android! Please provide this summary of care documentation to your next provider. Your primary care clinician is listed as NONE. If you have any questions after today's visit, please call [phone].

## 2018-02-12 NOTE — PROGRESS NOTES
Chief complaint:  Vaginal bleeding, since SAB on      Karine Singh is a 27 y.o.  female with a history of a SAB on 17    Kayleen took 1000mcg of cytotec which successfully passed the poc in December. Since then she has bled or spotted every day. Initially it was very heavy. Then again in January she had a week of heavy bleeding. She believes she passed some tissue on Thursday. The bleeding has been heavy since then. She reports feeling dizzy and light headed on Saturday.   She has brought the sample in of what she passed on Thursday.        OB History      Para Term  AB Living     5 3 3   2 3    SAB TAB Ectopic Molar Multiple Live Births     0         3              Past Medical History:   Diagnosis Date    Cholestasis of pregnancy 2014    Cholestasis of pregnancy 2012    Hearing loss 2004     BILATERAL GENETIC HISTORY     Vaginal delivery 2014            Past Surgical History:   Procedure Laterality Date    HX WISDOM TEETH EXTRACTION         as a teenager    INNER EAR SURGERY [facility]        titaneum stapes d/t hearing loss      Social History           Occupational History    stay at home mom              Social History Main Topics    Smoking status: Never Smoker    Smokeless tobacco: Never Used    Alcohol use No    Drug use: No    Sexual activity: Yes       Partners: Male             Family History   Problem Relation Age of Onset    Other Mother         BILATERAL HEARING LOSS    Cancer Father         skin cancer    Dementia Maternal Grandmother      Other Maternal Grandfather         SKIN CA    Other Other         2nd pregnancy at 36 weeks    Breast Cancer Paternal Aunt      Other Sister         appendectomy    Colon Cancer Neg Hx      Ovarian Cancer Neg Hx      Diabetes Neg Hx      Heart Disease Neg Hx      Stroke Neg Hx            No Known Allergies          Prior to Admission medications    Medication Sig Start Date End Date Taking? Authorizing Provider   miSOPROStol (CYTOTEC) 200 mcg tablet Take 5 tablets at one time, repeat in 24 hours if no results. 12/13/17     Adina CorderoNICKI   prenatal vit-iron fumarate-fa 27-0.8 mg Tab tablet Take 1 Tab by mouth daily.       Historical Provider         Review of Systems:  A comprehensive review of systems was negative except for that written in the History of Present Illness.      Objective:                Physical Exam:  Patient without distress. Heart: S1S2 present or slighlty tachycardic (NM=[id number])  Lung: clear to auscultation throughout lung fields, no wheezes, no rales, no rhonchi and normal respiratory effort  Abdomen: soft, nontender    SSE:  Pelvic exam: normal external genitalia, vulva, vagina, cervix, uterus and adnexa. Large amount of bleeding from cervical os. No odor noted.     H&H on 12/14/17  8.9/26.6  HCG on 1/5/18   95    Visit Vitals    /89    Pulse (!) 102    Ht 5' 3\" (1.6 m)    Wt 131 lb 5 oz (59.6 kg)    Breastfeeding No    BMI 23.26 kg/m2        Assessment:    SAB 12/14/17    Abnormal uterine bleeding  Anemia         Plan:      CBC, HcG  Appointment for ultrasound made  Will send tissue to pathology

## 2018-02-12 NOTE — PATIENT INSTRUCTIONS
Thank you for choosing [facility]. We know you have many options when it comes to your healthcare; we appreciate that you picked us. Our goal is to provide exceptional service and world class care for every patient. You may receive a survey in the mail or by email asking for your feedback. Please take a few minutes to share your thoughts about your recent visit. Your comments helps us understand what we do well and what we can do better. To ensure confidentiality, this survey is administered by an independent third-party, [address] [place] [address] participation will help us to continue and improve the quality of care that we provide to you, your family, friends, and neighbors. Thank you!

## 2018-02-12 NOTE — PROGRESS NOTES
Chief Complaint   Patient presents with    Follow-up     f/u misab     Visit Vitals    /89    Pulse (!) 102    Ht 5' 3\" (1.6 m)    Wt 131 lb 5 oz (59.6 kg)    LMP 02/10/2018    Breastfeeding No    BMI 23.26 kg/m2     1. Have you been to the ER, urgent care clinic since your last visit? Hospitalized since your last visit? Yes ER in Dec after taking cytotec    2. Have you seen or consulted any other health care providers outside of the [facility] since your last visit? Include any pap smears or colon screening.  No

## 2018-02-13 LAB
ERYTHROCYTE [DISTWIDTH] IN BLOOD BY AUTOMATED COUNT: 13.7 % (ref 12.3–15.4)
HCG INTACT+B SERPL-ACNC: 3 MIU/ML
HCT VFR BLD AUTO: 37.6 % (ref 34–46.6)
HGB BLD-MCNC: 12.2 G/DL (ref 11.1–15.9)
MCH RBC QN AUTO: 29.8 PG (ref 26.6–33)
MCHC RBC AUTO-ENTMCNC: 32.4 G/DL (ref 31.5–35.7)
MCV RBC AUTO: 92 FL (ref 79–97)
PLATELET # BLD AUTO: 305 X10E3/UL (ref 150–379)
RBC # BLD AUTO: 4.09 X10E6/UL (ref 3.77–5.28)
WBC # BLD AUTO: 5.4 X10E3/UL (ref 3.4–10.8)

## 2018-02-14 ENCOUNTER — TELEPHONE (OUTPATIENT)
Dept: MIDWIFE SERVICES | Age: 33
End: 2018-02-14

## 2018-02-14 DIAGNOSIS — N92.6 IRREGULAR UTERINE BLEEDING: Primary | ICD-10-CM

## 2018-02-14 RX ORDER — MEDROXYPROGESTERONE ACETATE 10 MG/1
10 TABLET ORAL DAILY
Qty: 10 TAB | Refills: 0 | Status: SHIPPED | OUTPATIENT
Start: 2018-02-14 | End: 2018-02-24

## 2018-02-14 NOTE — TELEPHONE ENCOUNTER
Spoke with Kayleen to review normal CBC, and HcG level showing not pregnant. US today showed possible RPOC. Will do 10 day course of Provera. Expect withdrawal bleed to follow. This should regulate cycles after that. Call with concerns or if she does not begin regular cycles. Escript sent.

## 2018-02-14 NOTE — PROGRESS NOTES
Reviewed lab work and today's US with Dr. Evan Lopez. US showed possible RPOC. Recommended 10 day course of Provera.

## 2018-09-12 ENCOUNTER — ROUTINE PRENATAL (OUTPATIENT)
Dept: MIDWIFE SERVICES | Age: 33
End: 2018-09-12

## 2018-09-12 ENCOUNTER — HOSPITAL ENCOUNTER (OUTPATIENT)
Dept: LAB | Age: 33
Discharge: HOME | End: 2018-09-12
Payer: MEDICAID

## 2018-09-12 VITALS
SYSTOLIC BLOOD PRESSURE: 111 MMHG | BODY MASS INDEX: 23.3 KG/M2 | WEIGHT: 131.5 LBS | HEART RATE: 83 BPM | DIASTOLIC BLOOD PRESSURE: 68 MMHG | HEIGHT: 63 IN

## 2018-09-12 DIAGNOSIS — Z12.4 SCREENING FOR CERVICAL CANCER: Primary | ICD-10-CM

## 2018-09-12 DIAGNOSIS — Z34.00 SUPERVISION OF NORMAL FIRST PREGNANCY, ANTEPARTUM: ICD-10-CM

## 2018-09-12 PROBLEM — Z87.19 HISTORY OF CHOLESTASIS DURING PREGNANCY: Status: ACTIVE | Noted: 2018-09-12

## 2018-09-12 PROBLEM — O03.9 COMPLETE MISCARRIAGE: Status: RESOLVED | Noted: 2017-12-26 | Resolved: 2018-09-12

## 2018-09-12 PROBLEM — Z87.59 HISTORY OF CHOLESTASIS DURING PREGNANCY: Status: ACTIVE | Noted: 2018-09-12

## 2018-09-12 LAB
ANTIBODY SCREEN, EXTERNAL: NEGATIVE
CHLAMYDIA, EXTERNAL: NEGATIVE
HBSAG, EXTERNAL: NEGATIVE
HIV, EXTERNAL: NEGATIVE
N. GONORRHEA, EXTERNAL: NEGATIVE
RUBELLA, EXTERNAL: NORMAL
T. PALLIDUM, EXTERNAL: NEGATIVE
URINALYSIS, EXTERNAL: NEGATIVE

## 2018-09-12 PROCEDURE — 88175 CYTOPATH C/V AUTO FLUID REDO: CPT | Performed by: ADVANCED PRACTICE MIDWIFE

## 2018-09-12 PROCEDURE — 87624 HPV HI-RISK TYP POOLED RSLT: CPT | Performed by: ADVANCED PRACTICE MIDWIFE

## 2018-09-12 NOTE — LETTER
9/12/2018 9:30 AM 
 
Ms. Kenyatta Hughes [address] [address] To whom it may concern, 
 
Ms. Kenyatta Hughes is currently pregnant with one baby and is due to deliver on April 21, 2019. If you have any questions or concerns, please contact our office. Sincerely, 
 
 
Yogi Turk CNM

## 2018-09-12 NOTE — LETTER
9/12/2018 9:27 AM 
 
Ms. Cullen Antonio [address] [address] To whom it may concern, 
 
Ms. Cullen Antonio is currently pregnant with one baby and is due to deliver on April 21, 2018. Any questions or concerns, please contact our office. Sincerely, 
 
 
La Nena Salvador CNM

## 2018-09-12 NOTE — PROGRESS NOTES
Chief Complaint Patient presents with  Pregnancy Visit Vitals  /68  Pulse 83  Ht 5' 3\" (1.6 m)  Wt 131 lb 8 oz (59.6 kg)  LMP 02/10/2018  BMI 23.29 kg/m2 1. Have you been to the ER, urgent care clinic since your last visit? Hospitalized since your last visit? No 
 
2. Have you seen or consulted any other health care providers outside of the [address] [place] [address] since your last visit? Include any pap smears or colon screening. No  
 
Here today for planned pregnancy Verbal order for pap smear and urine to be sent for culture and g/c per rachid solares cnm

## 2018-09-12 NOTE — PATIENT INSTRUCTIONS
Thank you for choosing [facility]. We know you have many options when it comes to your healthcare; we appreciate that you picked us. Our goal is to provide exceptional service and world class care for every patient. You may receive a survey in the mail or by email asking for your feedback. Please take a few minutes to share your thoughts about your recent visit. Your comments helps us understand what we do well and what we can do better. To ensure confidentiality, this survey is administered by an independent third-party, [address] [place] [address] participation will help us to continue and improve the quality of care that we provide to you, your family, friends, and neighbors. Thank you! Weeks 10 to 14 of Your Pregnancy: Care Instructions Your Care Instructions By weeks 10 to 15 of your pregnancy, the placenta has formed inside your uterus. It is possible to hear your baby's heartbeat with a special ultrasound device. Your baby's eyes can and do move. The arms and legs can bend. This is a good time to think about testing for birth defects. There are two types of tests: screening and diagnostic. Screening tests show the chance that a baby has a certain birth defect. They can't tell you for sure that your baby has a problem. Diagnostic tests show if a baby has a certain birth defect. It's your choice whether to have these tests. You and your partner can talk to your doctor or midwife about birth defects tests. Follow-up care is a key part of your treatment and safety. Be sure to make and go to all appointments, and call your doctor if you are having problems. It's also a good idea to know your test results and keep a list of the medicines you take. How can you care for yourself at home? Decide about tests · You can have screening tests and diagnostic tests to check for birth defects.  The decision to have a test for birth defects is personal. Think about your age, your chance of passing on a family disease, your need to know about any problems, and what you might do after you have the test results. ¨ Triple or quadruple (quad) blood tests. These screening tests can be done between 15 and 20 weeks of pregnancy. They check the amounts of three or four substances in your blood. The doctor looks at these test results, along with your age and other factors, to find out the chance that your baby may have certain problems. ¨ Amniocentesis. This diagnostic test is used to look for chromosomal problems in the baby's cells. It can be done between 15 and 20 weeks of pregnancy, usually around week 16. 
¨ Nuchal translucency test. This test uses ultrasound to measure the thickness of the area at the back of the baby's neck. An increase in the thickness can be an early sign of Down syndrome. ¨ Chorionic villus sampling (CVS). This is a test that looks for certain genetic problems with your baby. The same genes that are in your baby are in the placenta. A small piece of the placenta is taken out and tested. This test is done when you are 10 to 13 weeks pregnant. Ease discomfort · Slow down and take naps when you feel tired. · If your emotions swing, talk to someone. Crying, anxiety, and concentration problems are common. · If your gums bleed, try a softer toothbrush. If your gums are puffy and bleed a lot, see your dentist. 
· If you feel dizzy: ¨ Get up slowly after sitting or lying down. ¨ Drink plenty of fluids. ¨ Eat small snacks to keep your blood sugar stable. ¨ Put your head between your legs as though you were tying your shoelaces. ¨ Lie down with your legs higher than your head. Use pillows to prop up your feet. · If you have a headache: 
¨ Lie down. ¨ Ask your partner or a good friend for a neck massage. ¨ Try cool cloths over your forehead or across the back of your neck. ¨ Use acetaminophen (Tylenol) for pain relief.  Do not use nonsteroidal anti-inflammatory drugs (NSAIDs), such as ibuprofen (Advil, Motrin) or naproxen (Aleve), unless your doctor says it is okay. · If you have a nosebleed, pinch your nose gently, and hold it for a short while. To prevent nosebleeds, try massaging a small dab of petroleum jelly, such as Vaseline, in your nostrils. · If your nose is stuffed up, try saline (saltwater) nose sprays. Do not use decongestant sprays. Care for your breasts · Wear a bra that gives you good support. · Know that changes in your breasts are normal. 
¨ Your breasts may get larger and more tender. Tenderness usually gets better by 12 weeks. ¨ Your nipples may get darker and larger, and small bumps around your nipples may show more. ¨ The veins in your chest and breasts may show more. · Don't worry about \"toughening'\" your nipples. Breastfeeding will naturally do this. Where can you learn more? Go to http://mo-mykel.info/. Enter N276 in the search box to learn more about \"Weeks 10 to 14 of Your Pregnancy: Care Instructions. \" Current as of: November 21, 2017 Content Version: 11.7 © 2841-9579 [employer]. Care instructions adapted under license by [employer] (which disclaims liability or warranty for this information). If you have questions about a medical condition or this instruction, always ask your healthcare professional. [place] [address] any warranty or liability for your use of this information. Weeks 6 to 10 of Your Pregnancy: Care Instructions Your Care Instructions Congratulations on your pregnancy. This is an exciting and important time for you. During the first 6 to 10 weeks of your pregnancy, your body goes through many changes. Your baby grows very fast, even though you cannot feel it yet. You may start to notice that you feel different, both in your body and your emotions.  Because each woman's pregnancy is unique, there is no right way to feel. You may feel the healthiest you have ever been, or you may feel tired or sick to your stomach (\"morning sickness\"). These early weeks are a time to make healthy choices and to eat the best foods for you and your baby. This care sheet will give you some ideas. This is also a good time to think about birth defects testing. These are tests done during pregnancy to look for possible problems with the baby. First trimester tests for birth defects can be done between 8 and 17 weeks of pregnancy, depending on the test. Talk with your doctor about what kinds of tests are available. Follow-up care is a key part of your treatment and safety. Be sure to make and go to all appointments, and call your doctor if you are having problems. It's also a good idea to know your test results and keep a list of the medicines you take. How can you care for yourself at home? Eat well · Eat at least 3 meals and 2 healthy snacks every day. Eat fresh, whole foods, including: ¨ 7 or more servings of bread, tortillas, cereal, rice, pasta, or oatmeal. 
¨ 3 or more servings of vegetables, especially leafy green vegetables. ¨ 2 or more servings of fruits. ¨ 3 or more servings of milk, yogurt, or cheese. ¨ 2 or more servings of meat, turkey, chicken, fish, eggs, or dried beans. · Drink plenty of fluids, especially water. Avoid sodas and other sweetened drinks. · Choose foods that have important vitamins for your baby, such as calcium, iron, and folate. ¨ Dairy products, tofu, canned fish with bones, almonds, broccoli, dark leafy greens, corn tortillas, and fortified orange juice are good sources of calcium. ¨ Beef, poultry, liver, spinach, lentils, dried beans, fortified cereals, and dried fruits are rich in iron. ¨ Dark leafy greens, broccoli, asparagus, liver, fortified cereals, orange juice, peanuts, and almonds are good sources of folate. · Avoid foods that could harm your baby. ¨ Do not eat raw or undercooked meat, chicken, or fish (such as sushi or raw oysters). ¨ Do not eat raw eggs or foods that contain raw eggs, such as Caesar dressing. ¨ Do not eat soft cheeses and unpasteurized dairy foods, such as Brie, feta, or blue cheese. ¨ Do not eat fish that contains a lot of mercury, such as shark, swordfish, tilefish, or clif mackerel. Do not eat more than 6 ounces of tuna each week. ¨ Do not eat raw sprouts, especially alfalfa sprouts. ¨ Cut down on caffeine, such as coffee, tea, and cola. Protect yourself and your baby · Do not touch larissa litter or cat feces. They can cause an infection that could harm your baby. · High body temperature can be harmful to your baby. So if you want to use a sauna or hot tub, be sure to talk to your doctor about how to use it safely. [place] with morning sickness · Sip small amounts of water, juices, or shakes. Try drinking between meals, not with meals. · Eat 5 or 6 small meals a day. Try dry toast or crackers when you first get up, and eat breakfast a little later. · Avoid spicy, greasy, and fatty foods. · When you feel sick, open your windows or go for a short walk to get fresh air. · Try nausea wristbands. These help some women. · Tell your doctor if you think your prenatal vitamins make you sick. Where can you learn more? Go to http://mo-mykel.info/. Enter G112 in the search box to learn more about \"Weeks 6 to 10 of Your Pregnancy: Care Instructions. \" Current as of: November 21, 2017 Content Version: 11.7 © 1647-7426 [employer]. Care instructions adapted under license by [employer] (which disclaims liability or warranty for this information). If you have questions about a medical condition or this instruction, always ask your healthcare professional. [address] any warranty or liability for your use of this information.

## 2018-09-12 NOTE — PROGRESS NOTES
Current pregnancy history: 
 
Kaylee Morales is a 26 y.o. female who presents for the evaluation of pregnancy. Patient's last menstrual period was 07/15/2018. LMP history: 
The date of her LMP is certain. Her last menstrual period was normal and lasted for 4 to 5 days. A urine pregnancy test was positive about 4 weeks ago. She was not on the pill at conception. Based on her LMP, her EDC is 2019 and her EGA is 8 weeks,3 days. Her menstrual cycles are regular and occur approximately every 28 days  and range from 3 to 5 days. The last menses lasted  the usual number of days. Ultrasound data: 
She had an  ultrasound done by the ultrasound tech today which revealed a viable heard pregnancy with a gestational age of 11 weeks and 1days giving an EDC of . Pregnancy symptoms: 
 
Since her LMP she has experienced  urinary frequency, breast tenderness, and nausea. She has not been vomiting over the last few weeks. Associated signs and symptoms which she denies: dysuria, discharge, vaginal bleeding. She states she has gained weight:  Approximately 5 pounds over the last few weeks. Relevant past pregnancy history: She has the followiing pregnancy history: Her last pregnancy was uncomplicated. She does have a history of cholestasis of pregnancy with her last to pregnancies. She has no history of  delivery. Relevant past medical history:(relevant to this pregnancy): noncontributory. Pap/Occupational history: 
Last pap smear:  Results: Normal , it was repeated today Her occupation is: [employer]. Substance history: negative for alcohol, tobacco and street drugs. Positive for nothing. Exposure history: There is/are no indoor cat/s in the home. She admits close contact with children on a regular basis. She has had chicken pox or the vaccine in the past.  
Patient denies issues with domestic violence. Genetic Screening/Teratology Counseling: (Includes patient, baby's father, or anyone in either family with:) 1.  Patient's age >/= 28 at [facility] 37?-- yes, she declines all genetic counseling  . 2. Thalassemia ([facility] [place], [address], or  background): MCV<80?--no.    
3.  Neural tube defect (meningomyelocele, spina bifida, anencephaly)?--no.  
4.  Congenital heart defect?--no. 
5.  Down syndrome?--no.  
6.  Shane-Sachs ([employer], [place])?--no.  
7.  Canavan's Disease?--no.  
8.  Familial Dysautonomia?--no.  
9.  Sickle cell disease or trait ()? --no The patient has not been tested for sickle trait 10. Hemophilia or other blood disorders?--no. 11.  Muscular dystrophy?--no. 12.  Cystic fibrosis?--no. 13.  Bibb's Chorea?--no. 14.  Mental retardation/autism (if yes was person tested for Fragile X)?--no. 15.  Other inherited genetic or chromosomal disorder?--no. 12.  Maternal metabolic disorder (DM, PKU, etc)?--no. 17.  Patient or FOB with a child with a birth defect not listed above?--no. 
17a. Patient or FOB with a birth defect themselves?--no. 18.  Recurrent pregnancy loss, or stillbirth?--no. 19.  Any medications since LMP other than prenatal vitamins (include vitamins,  supplements, OTC meds, drugs, alcohol)?--no. 20.  Any other genetic/environmental exposure to discuss?--no. Infection History: 1. Lives with someone with TB or TB exposed?--no.  
2.  Patient or partner has history of genital herpes?--no. 
3.  Rash or viral illness since LMP?--no.   
4.  History of STD (GC, CT, HPV, syphilis, HIV)? --no  
5. Other: OTHER? Past Medical History:  
Diagnosis Date  Cholestasis of pregnancy 1/13/2014  Cholestasis of pregnancy 1/27/2012  Hearing loss 2004 BILATERAL GENETIC HISTORY  Vaginal delivery 1/13/2014 Past Surgical History:  
Procedure Laterality Date  HX WISDOM TEETH EXTRACTION    
 as a teenager  INNER EAR SURGERY PROC UNLISTED  2004 titaneum stapes d/t hearing loss Social History Occupational History  stay at home mom Social History Main Topics  Smoking status: Never Smoker  Smokeless tobacco: Never Used  Alcohol use No  
 Drug use: No  
 Sexual activity: Yes  
  Partners: Male Family History Problem Relation Age of Onset Orma Damme Other Mother BILATERAL HEARING LOSS  Cancer Father   
  skin cancer  Dementia Maternal Grandmother  Other Maternal Grandfather SKIN CA  
 Other Other 2nd pregnancy at 40 weeks  Breast Cancer Paternal Aunt  Other Sister   
  appendectomy  Colon Cancer Neg Hx   
 Ovarian Cancer Neg Hx  Diabetes Neg Hx   
 Heart Disease Neg Hx  Stroke Neg Hx No Known Allergies Prior to Admission medications Medication Sig Start Date End Date Taking? Authorizing Provider PNV [id number] 29 mg iron-1 mg -50 mg CPKD Take  by mouth. Yes Historical Provider Review of Systems: History obtained from the patient Constitutional: negative for weight loss, fever, night sweats HEENT: negative for new onset hearing loss, earache, congestion, snoring, sorethroat CV: negative for chest pain, palpitations, edema Resp: negative for cough, shortness of breath, wheezing Breast: negative for breast lumps, nipple discharge, galactorrhea GI: negative for change in bowel habits, abdominal pain, black or bloody stools : negative for frequency, dysuria, hematuria, vaginal discharge MSK: negative for back pain, joint pain, muscle pain Skin: negative for itching, rash, hives Neuro: negative for dizziness, headache, confusion, weakness Psych: negative for anxiety, depression, change in mood Heme/lymph: negative for bleeding, bruising, pallor Objective: 
Visit Vitals  /68  Pulse 83  Ht 5' 3\" (1.6 m)  Wt 131 lb 8 oz (59.6 kg)  LMP 07/15/2018  BMI 23.29 kg/m2 Physical Exam: PHYSICAL EXAMINATION Constitutional 
 · Appearance: well-nourished, well developed, alert, in no acute distress HENT 
· Head · Face: appears normal 
· Eyes: appear normal 
· Ears: normal 
· Mouth: normal 
· Lips: no lesions Neck · Inspection/Palpation: normal appearance, no masses or tenderness · Lymph Nodes: no lymphadenopathy present · Thyroid: gland size normal, nontender, no nodules or masses present on palpation Chest 
· Respiratory Effort: breathing unlabored · Auscultation: normal breath sounds Cardiovascular · Heart: 
· Auscultation: regular rate and rhythm without murmur Breasts · Inspection of Breasts: breasts symmetrical, no skin changes, no discharge present, nipple appearance normal, no skin retraction present · Palpation of Breasts and Axillae: no masses present on palpation, no breast tenderness · Axillary Lymph Nodes: no lymphadenopathy present Gastrointestinal 
· Abdominal Examination: abdomen non-tender to palpation, normal bowel sounds, no masses present · Liver and spleen: no hepatomegaly present, spleen not palpable · Hernias: no hernias identified Genitourinary · External Genitalia: normal appearance for age, no discharge present, no tenderness present, no inflammatory lesions present, no masses present, no atrophy present · Vagina: normal vaginal vault without central or paravaginal defects, no discharge present, no inflammatory lesions present, no masses present · Bladder: non-tender to palpation · Urethra: appears normal 
· Cervix: normal. Friable/ Pap completed. · Uterus: enlarged, normal shape, soft · Adnexa: no adnexal tenderness present, no adnexal masses present · Perineum: perineum within normal limits, no evidence of trauma, no rashes or skin lesions present · Anus: anus within normal limits, no hemorrhoids present · Inguinal Lymph Nodes: no lymphadenopathy present Skin · General Inspection: no rash, no lesions identified Neurologic/Psychiatric · Mental Status: · Orientation: grossly oriented to person, place and time · Mood and Affect: mood normal, affect appropriate Assessment:  
Intrauterine pregnancy with the following problems identified: none. Plan:  
 
Offered CF testing, CVS, Nuchal Translucency, MSAFP, amnio, and discussed NIPT. Declines all testing Course of pregnancy discussed including visit schedule, routine U/S, glucola testing, etc. 
Avoid alcoholic beverages and illicit/recreational drugs use Take prenatal vitamins or folic acid daily. Hospital and practice style discussed with coverage system. Discussed nutrition, toxoplasmosis precautions, sexual activity, exercise, need for influenza vaccine, environmental and work hazards, travel advice, screen for domestic violence, need for seat belts. Discussed seafood, unpasteurized dairy products, deli meat, artificial sweeteners, and caffeine. Information on prenatal classes/breastfeeding given. Patient encouraged not to smoke. Discussed current prescription drug use. Given medication list. 
Discussed the use of over the counter medications and chemicals. Pt understands risk of hemorrhage during pregnancy and post delivery and would accept blood products if necessary in life-threatening emergencies NOB labs ordered Handouts given to pt.

## 2018-09-12 NOTE — MR AVS SNAPSHOT
[address] [address] 1007 [facility] 
[phone] Patient: David Menendez MRN: [id number] :[id number] Visit Information Date & Time Provider Department Dept. Phone Encounter #  
 2018  9:00 AM Morenita Hines CNM [facility] OB-[facility]N [address] [id number] Upcoming Health Maintenance Date Due  
 PAP AKA CERVICAL CYTOLOGY 2017 Influenza Age 5 to Adult 2018 Allergies as of 2018  Review Complete On: 2018 By: Morenita Hines CNM No Known Allergies Current Immunizations  Reviewed on 1/15/2014 Name Date Influenza Vaccine PF 1/15/2014 10:14 AM  
 Influenza Vaccine Split 10/25/2011 Tdap 1/15/2014 10:30 AM  
  
 Not reviewed this visit You Were Diagnosed With   
  
 Codes Comments Supervision of normal first pregnancy, antepartum     ICD-10-CM: Z34.00 ICD-9-CM: V22.0 Vitals BP Pulse Height(growth percentile) Weight(growth percentile) LMP BMI  
 111/68 83 5' 3\" (1.6 m) 131 lb 8 oz (59.6 kg) 07/15/2018 23.29 kg/m2 OB Status Smoking Status Pregnant Never Smoker BMI and BSA Data Body Mass Index Body Surface Area  
 23.29 kg/m 2 1.63 m 2 Preferred Pharmacy Pharmacy Name Phone [facility] [address] [phone] Your Updated Medication List  
  
   
This list is accurate as of 18  9:55 AM.  Always use your most recent med list.  
  
  
  
  
 PNV No12-Iron-FA-DSS-OM-3 29 mg iron-1 mg -50 mg Cpkd Take  by mouth. We Performed the Following CBC WITH AUTOMATED DIFF [58837 CPT(R)] HEP B SURFACE AG [id number] CPT(R)] HIV 1/2 AG/AB, 4TH GENERATION,W RFLX CONFIRM [id number] CPT(R)] RUBELLA AB, IGG [id number] CPT(R)] T PALLIDUM SCREEN W/REFLEX [[id number] Custom] TYPE & SCREEN [[id number] Custom] Comments: ENTER SURGERY DATE IF FOR PRE-OP TESTING. VITAMIN D, 25 HYDROXY [id number] CPT(R)] Patient Instructions Thank you for choosing [facility]. We know you have many options when it comes to your healthcare; we appreciate that you picked us. Our goal is to provide exceptional service and world class care for every patient. You may receive a survey in the mail or by email asking for your feedback. Please take a few minutes to share your thoughts about your recent visit. Your comments helps us understand what we do well and what we can do better. To ensure confidentiality, this survey is administered by an independent third-party, [email or web address] Cameron participation will help us to continue and improve the quality of care that we provide to you, your family, friends, and neighbors. Thank you! Weeks 10 to 14 of Your Pregnancy: Care Instructions Your Care Instructions By weeks 10 to 15 of your pregnancy, the placenta has formed inside your uterus. It is possible to hear your baby's heartbeat with a special ultrasound device. Your baby's eyes can and do move. The arms and legs can bend. This is a good time to think about testing for birth defects. There are two types of tests: screening and diagnostic. Screening tests show the chance that a baby has a certain birth defect. They can't tell you for sure that your baby has a problem. Diagnostic tests show if a baby has a certain birth defect. It's your choice whether to have these tests. You and your partner can talk to your doctor or midwife about birth defects tests. Follow-up care is a key part of your treatment and safety. Be sure to make and go to all appointments, and call your doctor if you are having problems. It's also a good idea to know your test results and keep a list of the medicines you take. How can you care for yourself at home? Decide about tests · You can have screening tests and diagnostic tests to check for birth defects. The decision to have a test for birth defects is personal. Think about your age, your chance of passing on a family disease, your need to know about any problems, and what you might do after you have the test results. ¨ Triple or quadruple (quad) blood tests. These screening tests can be done between 15 and 20 weeks of pregnancy. They check the amounts of three or four substances in your blood. The doctor looks at these test results, along with your age and other factors, to find out the chance that your baby may have certain problems. ¨ Amniocentesis. This diagnostic test is used to look for chromosomal problems in the baby's cells. It can be done between 15 and 20 weeks of pregnancy, usually around week 16. 
¨ Nuchal translucency test. This test uses ultrasound to measure the thickness of the area at the back of the baby's neck. An increase in the thickness can be an early sign of Down syndrome. ¨ Chorionic villus sampling (CVS). This is a test that looks for certain genetic problems with your baby. The same genes that are in your baby are in the placenta. A small piece of the placenta is taken out and tested. This test is done when you are 10 to 13 weeks pregnant. Ease discomfort · Slow down and take naps when you feel tired. · If your emotions swing, talk to someone. Crying, anxiety, and concentration problems are common. · If your gums bleed, try a softer toothbrush. If your gums are puffy and bleed a lot, see your dentist. 
· If you feel dizzy: ¨ Get up slowly after sitting or lying down. ¨ Drink plenty of fluids. ¨ Eat small snacks to keep your blood sugar stable. ¨ Put your head between your legs as though you were tying your shoelaces. ¨ Lie down with your legs higher than your head. Use pillows to prop up your feet. · If you have a headache: 
¨ Lie down. ¨ Ask your partner or a good friend for a neck massage. ¨ Try cool cloths over your forehead or across the back of your neck. ¨ Use acetaminophen (Tylenol) for pain relief. Do not use nonsteroidal anti-inflammatory drugs (NSAIDs), such as ibuprofen (Advil, Motrin) or naproxen (Aleve), unless your doctor says it is okay. · If you have a nosebleed, pinch your nose gently, and hold it for a short while. To prevent nosebleeds, try massaging a small dab of petroleum jelly, such as Vaseline, in your nostrils. · If your nose is stuffed up, try saline (saltwater) nose sprays. Do not use decongestant sprays. Care for your breasts · Wear a bra that gives you good support. · Know that changes in your breasts are normal. 
¨ Your breasts may get larger and more tender. Tenderness usually gets better by 12 weeks. ¨ Your nipples may get darker and larger, and small bumps around your nipples may show more. ¨ The veins in your chest and breasts may show more. · Don't worry about \"toughening'\" your nipples. Breastfeeding will naturally do this. Where can you learn more? Go to http://mo-mykel.info/. Enter Z157 in the search box to learn more about \"Weeks 10 to 14 of Your Pregnancy: Care Instructions. \" Current as of: November 21, 2017 Content Version: 11.7 © 7960-1220 [employer]. Care instructions adapted under license by [employer] (which disclaims liability or warranty for this information). If you have questions about a medical condition or this instruction, always ask your healthcare professional. [address] [address] any warranty or liability for your use of this information. Weeks 6 to 10 of Your Pregnancy: Care Instructions Your Care Instructions Congratulations on your pregnancy. This is an exciting and important time for you. During the first 6 to 10 weeks of your pregnancy, your body goes through many changes.  Your baby grows very fast, even though you cannot feel it yet. You may start to notice that you feel different, both in your body and your emotions. Because each woman's pregnancy is unique, there is no right way to feel. You may feel the healthiest you have ever been, or you may feel tired or sick to your stomach (\"morning sickness\"). These early weeks are a time to make healthy choices and to eat the best foods for you and your baby. This care sheet will give you some ideas. This is also a good time to think about birth defects testing. These are tests done during pregnancy to look for possible problems with the baby. First trimester tests for birth defects can be done between 8 and 17 weeks of pregnancy, depending on the test. Talk with your doctor about what kinds of tests are available. Follow-up care is a key part of your treatment and safety. Be sure to make and go to all appointments, and call your doctor if you are having problems. It's also a good idea to know your test results and keep a list of the medicines you take. How can you care for yourself at home? Eat well · Eat at least 3 meals and 2 healthy snacks every day. Eat fresh, whole foods, including: ¨ 7 or more servings of bread, tortillas, cereal, rice, pasta, or oatmeal. 
¨ 3 or more servings of vegetables, especially leafy green vegetables. ¨ 2 or more servings of fruits. ¨ 3 or more servings of milk, yogurt, or cheese. ¨ 2 or more servings of meat, turkey, chicken, fish, eggs, or dried beans. · Drink plenty of fluids, especially water. Avoid sodas and other sweetened drinks. · Choose foods that have important vitamins for your baby, such as calcium, iron, and folate. ¨ Dairy products, tofu, canned fish with bones, almonds, broccoli, dark leafy greens, corn tortillas, and fortified orange juice are good sources of calcium. ¨ Beef, poultry, liver, spinach, lentils, dried beans, fortified cereals, and dried fruits are rich in iron. ¨ Dark leafy greens, broccoli, asparagus, liver, fortified cereals, orange juice, peanuts, and almonds are good sources of folate. · Avoid foods that could harm your baby. ¨ Do not eat raw or undercooked meat, chicken, or fish (such as sushi or raw oysters). ¨ Do not eat raw eggs or foods that contain raw eggs, such as Caesar dressing. ¨ Do not eat soft cheeses and unpasteurized dairy foods, such as Brie, feta, or blue cheese. ¨ Do not eat fish that contains a lot of mercury, such as shark, swordfish, tilefish, or clif mackerel. Do not eat more than 6 ounces of tuna each week. ¨ Do not eat raw sprouts, especially alfalfa sprouts. ¨ Cut down on caffeine, such as coffee, tea, and cola. Protect yourself and your baby · Do not touch larissa litter or cat feces. They can cause an infection that could harm your baby. · High body temperature can be harmful to your baby. So if you want to use a sauna or hot tub, be sure to talk to your doctor about how to use it safely. Salix with morning sickness · Sip small amounts of water, juices, or shakes. Try drinking between meals, not with meals. · Eat 5 or 6 small meals a day. Try dry toast or crackers when you first get up, and eat breakfast a little later. · Avoid spicy, greasy, and fatty foods. · When you feel sick, open your windows or go for a short walk to get fresh air. · Try nausea wristbands. These help some women. · Tell your doctor if you think your prenatal vitamins make you sick. Where can you learn more? Go to http://mo-mykel.info/. Enter G112 in the search box to learn more about \"Weeks 6 to 10 of Your Pregnancy: Care Instructions. \" Current as of: November 21, 2017 Content Version: 11.7 © 3345-8101 [employer]. Care instructions adapted under license by [employer] (which disclaims liability or warranty for this information).  If you have questions about a medical condition or this instruction, always ask your healthcare professional. [address] any warranty or liability for your use of this information. Introducing [facility]! Dear Mainor Morales: Thank you for requesting a [employer] account. Our records indicate that you already have an active [employer] account. You can access your account anytime at [email or web address] Did you know that you can access your hospital and ER discharge instructions at any time in [employer] You can also review all of your test results from your hospital stay or ER visit. Additional Information If you have questions, please visit the Frequently Asked Questions section of the [employer] website at [email or web address] Remember, [employer] is NOT to be used for urgent needs. For medical emergencies, dial 911. Now available from your iPhone and Android! Please provide this summary of care documentation to your next provider. Your primary care clinician is listed as NONE. If you have any questions after today's visit, please call [phone].

## 2018-09-14 LAB
25(OH)D3+25(OH)D2 SERPL-MCNC: 30.3 NG/ML (ref 30–100)
ABO GROUP BLD: NORMAL
BACTERIA UR CULT: NO GROWTH
BASOPHILS # BLD AUTO: 0.1 X10E3/UL (ref 0–0.2)
BASOPHILS NFR BLD AUTO: 1 %
BLD GP AB SCN SERPL QL: NEGATIVE
C TRACH RRNA SPEC QL NAA+PROBE: NEGATIVE
EOSINOPHIL # BLD AUTO: 0.1 X10E3/UL (ref 0–0.4)
EOSINOPHIL NFR BLD AUTO: 2 %
ERYTHROCYTE [DISTWIDTH] IN BLOOD BY AUTOMATED COUNT: 14.3 % (ref 12.3–15.4)
HBV SURFACE AG SERPL QL IA: NEGATIVE
HCT VFR BLD AUTO: 36.4 % (ref 34–46.6)
HGB BLD-MCNC: 12.1 G/DL (ref 11.1–15.9)
HIV 1+2 AB+HIV1 P24 AG SERPL QL IA: NON REACTIVE
IMM GRANULOCYTES # BLD: 0 X10E3/UL (ref 0–0.1)
IMM GRANULOCYTES NFR BLD: 0 %
LYMPHOCYTES # BLD AUTO: 1.4 X10E3/UL (ref 0.7–3.1)
LYMPHOCYTES NFR BLD AUTO: 22 %
MCH RBC QN AUTO: 29.7 PG (ref 26.6–33)
MCHC RBC AUTO-ENTMCNC: 33.2 G/DL (ref 31.5–35.7)
MCV RBC AUTO: 89 FL (ref 79–97)
MONOCYTES # BLD AUTO: 0.5 X10E3/UL (ref 0.1–0.9)
MONOCYTES NFR BLD AUTO: 8 %
N GONORRHOEA RRNA SPEC QL NAA+PROBE: NEGATIVE
NEUTROPHILS # BLD AUTO: 4.4 X10E3/UL (ref 1.4–7)
NEUTROPHILS NFR BLD AUTO: 67 %
PLATELET # BLD AUTO: 246 X10E3/UL (ref 150–379)
RBC # BLD AUTO: 4.07 X10E6/UL (ref 3.77–5.28)
RH BLD: POSITIVE
RUBV IGG SERPL IA-ACNC: 1.31 INDEX
T PALLIDUM AB SER QL IA: NEGATIVE
WBC # BLD AUTO: 6.5 X10E3/UL (ref 3.4–10.8)

## 2018-09-16 NOTE — PROGRESS NOTES
WildTangent message sent with normal results. Vitamin D borderline normal. May start Vitamin D3 1000 international units  Daily to maintain normal range.

## 2018-10-08 ENCOUNTER — ROUTINE PRENATAL (OUTPATIENT)
Dept: MIDWIFE SERVICES | Age: 33
End: 2018-10-08

## 2018-10-08 VITALS
BODY MASS INDEX: 23.39 KG/M2 | DIASTOLIC BLOOD PRESSURE: 64 MMHG | WEIGHT: 132 LBS | SYSTOLIC BLOOD PRESSURE: 126 MMHG | HEIGHT: 63 IN | HEART RATE: 94 BPM

## 2018-10-08 DIAGNOSIS — O09.299 SUPERVISION OF PREGNANCY WITH OTHER POOR REPRODUCTIVE OR OBSTETRIC HISTORY, ANTEPARTUM: Primary | ICD-10-CM

## 2018-10-08 NOTE — PROGRESS NOTES
Chief Complaint   Patient presents with    Routine Prenatal Visit     12w1d     Visit Vitals    /64    Pulse 94    Ht 5' 3\" (1.6 m)    Wt 132 lb (59.9 kg)    LMP 07/15/2018    BMI 23.38 kg/m2     1. Have you been to the ER, urgent care clinic since your last visit? Hospitalized since your last visit? No    2. Have you seen or consulted any other health care providers outside of the [address] [place] [address] since your last visit? Include any pap smears or colon screening. No     Here today for a routine prenatal visit and she has no complaints or concerns.

## 2018-10-08 NOTE — PATIENT INSTRUCTIONS

## 2018-10-08 NOTE — PROGRESS NOTES
S: Feeling well. No significant complaints or concerns except felt like not pregnant any more since no symptoms,. No quickening yet. Denies ctxs, LOF, or vaginal bldng. Denies travel to [facility] affected area. O: See prenatal flowsheet for maternal and fetal exam  Blood pressure 126/64, pulse 94, height 5' 3\" (1.6 m), weight 132 lb (59.9 kg), last menstrual period 07/15/2018. A:G6   12w1d   Size=Dates    P: Labs reviewed. Reassurance given. Ultrasound consult next visit.    See prenatal checklist for other topics covered during today's visit  RTO in 4 weeks for FILEMON with NICKI

## 2018-11-05 ENCOUNTER — ROUTINE PRENATAL (OUTPATIENT)
Dept: OBGYN CLINIC | Age: 33
End: 2018-11-05

## 2018-11-05 VITALS
BODY MASS INDEX: 23.92 KG/M2 | HEIGHT: 63 IN | SYSTOLIC BLOOD PRESSURE: 119 MMHG | WEIGHT: 135 LBS | RESPIRATION RATE: 18 BRPM | HEART RATE: 84 BPM | DIASTOLIC BLOOD PRESSURE: 66 MMHG | TEMPERATURE: 97.4 F

## 2018-11-05 DIAGNOSIS — O09.292 SUPERVISION OF PREGNANCY WITH HISTORY OF ABORTIVE OUTCOME, SECOND TRIMESTER: ICD-10-CM

## 2018-11-05 DIAGNOSIS — Z23 ENCOUNTER FOR IMMUNIZATION: Primary | ICD-10-CM

## 2018-11-05 NOTE — PROGRESS NOTES
Monty Prado is a 26 y.o. female    Chief Complaint   Patient presents with    Routine Prenatal Visit     16 week       1. Have you been to the ER, urgent care clinic since your last visit? Hospitalized since your last visit? No  M  2. Have you seen or consulted any other health care providers outside of the [address] [place] [address] since your last visit? Include any pap smears or colon screening.  No      Visit Vitals  /66 (BP 1 Location: Left arm, BP Patient Position: Sitting)   Pulse 84   Temp 97.4 °F (36.3 °C) (Oral)   Resp 18   Ht 5' 3\" (1.6 m)   Wt 135 lb (61.2 kg)   BMI 23.91 kg/m²           Health Maintenance Due   Topic Date Due    Influenza Age 5 to Adult  08/01/2018

## 2018-11-05 NOTE — PROGRESS NOTES
S: Feeling well. No significant complaints or concerns. No quickening yet. Denies ctxs, LOF, or vaginal bldng. Denies travel to [place] affected area. Interested in flu vaccine after . Continues to decline genetic testing. O: See prenatal flowsheet for maternal and fetal exam. Discussed recommendation for flu vaccination during pregnancy including flu is more likely to cause severe illness in pregnant women than in women who are not pregnant,  getting a flu shot is the first and most important step in protecting against flu, the flu shot given during pregnancy has been shown to protect both the mother and her baby (up to 7 months old) from flu, and flu shots are a safe way to protect the mother and her unborn child from serious illness and complications of flu. Pt giiven flu vaccine today. Pt given CDC Vaccine Information Statement   Blood pressure 119/66, pulse 84, temperature 97.4 °F (36.3 °C), temperature source Oral, resp. rate 18, height 5' 3\" (1.6 m), weight 135 lb (61.2 kg), last menstrual period 07/15/2018.]  Screening for aneuploidy should be an informed patient choice and screening or diagnostic testing should be discussed and offered to all patients, regardless of maternal age. We have discussed the options and Kayleen declines testing at this time. If over 28years old, it is suggested that the anatomy ultrasound be completed in The [address] [place] [address], where the ultrasound is reviewed by [facility]. This may incur additional costs, which may or may not be covered by your insurance. The patient has decided to have  anatomy ultrasound completed with [facility]. Anatomy ultrasound ordered with [facility] due to [facility] status.       A:G6   16w1d   Size=Dates    P: Ultrasound ordered  Flu vaccine given  See prenatal checklist for other topics covered during today's visit  RTO in 4 weeks for FILEMON with NICKI

## 2018-12-05 ENCOUNTER — HOSPITAL ENCOUNTER (OUTPATIENT)
Dept: PERINATAL CARE | Age: 33
Discharge: HOME | End: 2018-12-05
Attending: OBSTETRICS & GYNECOLOGY
Payer: MEDICAID

## 2018-12-05 ENCOUNTER — ROUTINE PRENATAL (OUTPATIENT)
Dept: OBGYN CLINIC | Age: 33
End: 2018-12-05

## 2018-12-05 VITALS — SYSTOLIC BLOOD PRESSURE: 110 MMHG | DIASTOLIC BLOOD PRESSURE: 62 MMHG | WEIGHT: 138 LBS | BODY MASS INDEX: 24.45 KG/M2

## 2018-12-05 DIAGNOSIS — Z34.82 ENCOUNTER FOR SUPERVISION OF OTHER NORMAL PREGNANCY IN SECOND TRIMESTER: Primary | ICD-10-CM

## 2018-12-05 DIAGNOSIS — Z34.00 SUPERVISION OF NORMAL FIRST PREGNANCY, ANTEPARTUM: ICD-10-CM

## 2018-12-05 PROCEDURE — 76811 OB US DETAILED SNGL FETUS: CPT | Performed by: OBSTETRICS & GYNECOLOGY

## 2018-12-05 NOTE — PROGRESS NOTES
S: Feeling well. No significant complaints or concerns. Reports fetal mvmt. Denies ctxs, LOF, or vaginal bldng. US today. It's a boy! LLP  [facility]. O: See prenatal flowsheet for maternal and fetal exam  Blood pressure 110/62, weight 138 lb (62.6 kg), last menstrual period 07/15/2018.     A:  20w3d   Size=Dates    P: Review US, needs f/u US for LLP  See prenatal checklist for other topics covered during today's visit  RTO in 4 weeks for FILEMON with CNM

## 2018-12-10 NOTE — PROGRESS NOTES
MFM US for AMA. Short femur; placenta low-lying, anterior -> rpt 28-32wks Declines screening or invasive testing.

## 2018-12-11 PROBLEM — O44.42 LOW LYING PLACENTA NOS OR WITHOUT HEMORRHAGE, SECOND TRIMESTER: Status: ACTIVE | Noted: 2018-12-11

## 2018-12-13 ENCOUNTER — TELEPHONE (OUTPATIENT)
Dept: OBGYN CLINIC | Age: 33
End: 2018-12-13

## 2018-12-13 NOTE — TELEPHONE ENCOUNTER
Call received at [phone]    26year old  21w4d pregnant patient last seen in the office on 18. [facility] dentistry calling to get dental clearance for the patient to have treatment. Letter, composed, printed,signed and faxed to dental office .     Fax confirmation received

## 2018-12-13 NOTE — LETTER
12/13/2018 11:06 AM 
 
Ms. Monty Pr[address] [place] [address] To Dental Consultant, Monty Prado is in need of dental care. This patient is obstetrically cleared for dental procedures with the following considerations: 1. Please use an abdominal and thyroid shield when dental x-rays. 2.  Broad spectrum Penicillins or Cephalosporins and Tylenol products my be 
                 used as needed. Tetracyclines, Nsaids and Quinolones are contraindicated  
                 in pregnancy. 3.  Local Anesthesia without Epinepherine is permissible. Sincerely, 
 
 
Abdirizak Montejo CNM

## 2019-01-07 ENCOUNTER — ROUTINE PRENATAL (OUTPATIENT)
Dept: OBGYN CLINIC | Age: 34
End: 2019-01-07

## 2019-01-07 VITALS — DIASTOLIC BLOOD PRESSURE: 64 MMHG | WEIGHT: 146 LBS | BODY MASS INDEX: 25.86 KG/M2 | SYSTOLIC BLOOD PRESSURE: 110 MMHG

## 2019-01-07 DIAGNOSIS — Z34.00 SUPERVISION OF NORMAL FIRST PREGNANCY, ANTEPARTUM: ICD-10-CM

## 2019-01-07 DIAGNOSIS — O44.42 LOW LYING PLACENTA NOS OR WITHOUT HEMORRHAGE, SECOND TRIMESTER: Primary | ICD-10-CM

## 2019-01-07 NOTE — PROGRESS NOTES
S: Feeling well. No significant complaints or concerns. Reports consistent, daily fetal mvmt. Denies ctxs, LOF, or vaginal bldng. No bleeding with LLP noted. [facility]. O: See prenatal flowsheet for maternal and fetal exam  Blood pressure 110/64, weight 146 lb (66.2 kg), last menstrual period 07/15/2018. A:G6   25w1d   Size=Dates    P: f/u ultrasound scheduled with FILEMON. Will get 28 week labs at next visit. Diet reviewed.   See prenatal checklist for other topics covered during today's visit  RTO in 3 weeks for FILEMON with NICKI

## 2019-01-07 NOTE — PATIENT INSTRUCTIONS
Weeks 22 to 26 of Your Pregnancy: Care Instructions  Your Care Instructions    As you enter your 7th month of pregnancy at week 26, your baby's lungs are growing stronger and getting ready to breathe. You may notice that your baby responds to the sound of your or your partner's voice. You may also notice that your baby does less turning and twisting and more squirming or jerking. Jerking often means that your baby has the hiccups. Hiccups are perfectly normal and are only temporary. You may want to think about attending a childbirth preparation class. This is also a good time to start thinking about whether you want to have pain medicine during labor. Most pregnant women are tested for gestational diabetes between weeks 25 and 28. Gestational diabetes occurs when your blood sugar level gets too high when you're pregnant. The test is important, because you can have gestational diabetes and not know it. But the condition can cause problems for your baby. Follow-up care is a key part of your treatment and safety. Be sure to make and go to all appointments, and call your doctor if you are having problems. It's also a good idea to know your test results and keep a list of the medicines you take. How can you care for yourself at home? Ease discomfort from your baby's kicking  · Change your position. Sometimes this will cause your baby to change position too. · Take a deep breath while you raise your arm over your head. Then breathe out while you drop your arm. Do Kegel exercises to prevent urine from leaking  · You can do Kegel exercises while you stand or sit. ? Squeeze the same muscles you would use to stop your urine. Your belly and thighs should not move. ? Hold the squeeze for 3 seconds, and then relax for 3 seconds. ? Start with 3 seconds. Then add 1 second each week until you are able to squeeze for 10 seconds. ? Repeat the exercise 10 to 15 times for each session.  Do three or more sessions each day.  Ease or reduce swelling in your feet, ankles, hands, and fingers  · If your fingers are puffy, take off your rings. · Do not eat high-salt foods, such as potato chips. · Prop up your feet on a stool or couch as much as possible. Sleep with pillows under your feet. · Do not stand for long periods of time or wear tight shoes. · Wear support stockings. Where can you learn more? Go to http://mo-mykel.info/. Enter G264 in the search box to learn more about \"Weeks 22 to 26 of Your Pregnancy: Care Instructions. \"  Current as of: November 21, 2017  Content Version: 11.8  © 0976-7461 Healthwise, [employer]ingu[employer]. Care instructions adapted under license by [employer] (which disclaims liability or warranty for this information). If you have questions about a medical condition or this instruction, always ask your healthcare professional. [place] [address] any warranty or liability for your use of this information.

## 2019-01-28 ENCOUNTER — ROUTINE PRENATAL (OUTPATIENT)
Dept: OBGYN CLINIC | Age: 34
End: 2019-01-28

## 2019-01-28 VITALS — DIASTOLIC BLOOD PRESSURE: 73 MMHG | WEIGHT: 152.8 LBS | SYSTOLIC BLOOD PRESSURE: 132 MMHG | BODY MASS INDEX: 27.07 KG/M2

## 2019-01-28 DIAGNOSIS — O44.42 LOW LYING PLACENTA NOS OR WITHOUT HEMORRHAGE, SECOND TRIMESTER: ICD-10-CM

## 2019-01-28 DIAGNOSIS — Z3A.28 28 WEEKS GESTATION OF PREGNANCY: Primary | ICD-10-CM

## 2019-01-28 DIAGNOSIS — Z34.00 SUPERVISION OF NORMAL FIRST PREGNANCY, ANTEPARTUM: ICD-10-CM

## 2019-01-28 NOTE — PROGRESS NOTES
S: Feeling well. No significant complaints or concerns. Reports consistent, daily fetal mvmt. Denies ctxs, LOF, or vaginal bldng. [facility] O: See prenatal flowsheet for maternal and fetal exam  Blood pressure 132/73, weight 152 lb 12.8 oz (69.3 kg), last menstrual period 07/15/2018. A:G6   28w1d   Size=Dates    P: 28 week labs today  See prenatal checklist for other topics covered during today's visit  RTO in 2 weeks for FILEMON with CNM  Discussed USPFT recommendation for Tdap during every pregnancy, optimal timing of administration between 27 to 36 weeks gestation-although can be done at any time during pregnancy, and that people that will be in close contact with her infant during the first year should also receive a Tdap vaccine if they have not previously received the Tdap vaccine. Pt states understanding. Pt received Tdap vaccine today.

## 2019-01-28 NOTE — PROGRESS NOTES
Chief Complaint   Patient presents with    Routine Prenatal Visit     Visit Vitals  /73   Wt 152 lb 12.8 oz (69.3 kg)   LMP 07/15/2018   BMI 27.07 kg/m²     Here today for a routine prenatal visit and she has no complaints or concerns. Doing one hour gtt today    After obtaining consent, and per orders of tova bland cnm, injection of tdap   given in left arm by Juan Culp RN. Patient instructed to remain in clinic for 20 minutes afterwards, and to report any adverse reaction to me immediately. Lot: [id number] Exp: 5/3/20 NDC: 51058-594-45 , VIS given.

## 2019-01-28 NOTE — LETTER
1/28/2019 10:09 AM 
 
Ms. John Montanez [address] [address] To whom it may concern: Ms Gómez Euceda is an obstetric patient at [address] [place] [address]. She may receive a massage during pregnancy. Sincerely, 
 
 
Tello Ugarte CNM

## 2019-01-29 LAB
ERYTHROCYTE [DISTWIDTH] IN BLOOD BY AUTOMATED COUNT: 13.5 % (ref 12.3–15.4)
GLUCOSE 1H P 50 G GLC PO SERPL-MCNC: 96 MG/DL (ref 65–139)
HCT VFR BLD AUTO: 34.9 % (ref 34–46.6)
HGB BLD-MCNC: 11.6 G/DL (ref 11.1–15.9)
MCH RBC QN AUTO: 31.7 PG (ref 26.6–33)
MCHC RBC AUTO-ENTMCNC: 33.2 G/DL (ref 31.5–35.7)
MCV RBC AUTO: 95 FL (ref 79–97)
PLATELET # BLD AUTO: 278 X10E3/UL (ref 150–379)
RBC # BLD AUTO: 3.66 X10E6/UL (ref 3.77–5.28)
WBC # BLD AUTO: 9.7 X10E3/UL (ref 3.4–10.8)

## 2019-02-12 ENCOUNTER — ROUTINE PRENATAL (OUTPATIENT)
Dept: OBGYN CLINIC | Age: 34
End: 2019-02-12

## 2019-02-12 VITALS — BODY MASS INDEX: 26.93 KG/M2 | WEIGHT: 152 LBS | SYSTOLIC BLOOD PRESSURE: 130 MMHG | DIASTOLIC BLOOD PRESSURE: 82 MMHG

## 2019-02-12 DIAGNOSIS — Z34.93 ENCOUNTER FOR SUPERVISION OF LOW-RISK PREGNANCY IN THIRD TRIMESTER: Primary | ICD-10-CM

## 2019-02-12 DIAGNOSIS — Z34.00 SUPERVISION OF NORMAL FIRST PREGNANCY, ANTEPARTUM: ICD-10-CM

## 2019-02-12 PROBLEM — O44.42 LOW LYING PLACENTA NOS OR WITHOUT HEMORRHAGE, SECOND TRIMESTER: Status: RESOLVED | Noted: 2018-12-11 | Resolved: 2019-02-12

## 2019-02-28 ENCOUNTER — ROUTINE PRENATAL (OUTPATIENT)
Dept: OBGYN CLINIC | Age: 34
End: 2019-02-28

## 2019-02-28 VITALS — DIASTOLIC BLOOD PRESSURE: 74 MMHG | WEIGHT: 155 LBS | SYSTOLIC BLOOD PRESSURE: 124 MMHG | BODY MASS INDEX: 27.46 KG/M2

## 2019-02-28 DIAGNOSIS — Z34.93 ENCOUNTER FOR SUPERVISION OF LOW-RISK PREGNANCY IN THIRD TRIMESTER: Primary | ICD-10-CM

## 2019-02-28 DIAGNOSIS — Z34.00 SUPERVISION OF NORMAL FIRST PREGNANCY, ANTEPARTUM: ICD-10-CM

## 2019-03-14 ENCOUNTER — ROUTINE PRENATAL (OUTPATIENT)
Dept: OBGYN CLINIC | Age: 34
End: 2019-03-14

## 2019-03-14 VITALS — BODY MASS INDEX: 27.46 KG/M2 | WEIGHT: 155 LBS | DIASTOLIC BLOOD PRESSURE: 75 MMHG | SYSTOLIC BLOOD PRESSURE: 113 MMHG

## 2019-03-14 DIAGNOSIS — Z34.00 SUPERVISION OF NORMAL FIRST PREGNANCY, ANTEPARTUM: Primary | ICD-10-CM

## 2019-03-14 NOTE — PROGRESS NOTES
S: Feeling well. No significant complaints or concerns. Reports consistent, daily fetal mvmt. Denies ctxs, LOF, or vaginal bldng. Denies travel to [facility] affected area. O: See prenatal flowsheet for maternal and fetal exam. Birth control options reviewed in detail. Blood pressure 113/75, weight 155 lb (70.3 kg), last menstrual period 07/15/2018. A:  34w4d   Size=Dates    P: GBS at next visit.    See prenatal checklist for other topics covered during today's visit  RTO in 2 weeks for FILEMON with CNM

## 2019-03-14 NOTE — PROGRESS NOTES
Chief Complaint   Patient presents with    Routine Prenatal Visit     Visit Vitals  /75   Wt 155 lb (70.3 kg)   LMP 07/15/2018   BMI 27.46 kg/m²     1. Have you been to the ER, urgent care clinic since your last visit? Hospitalized since your last visit? No    2. Have you seen or consulted any other health care providers outside of the [address] [place] [address] since your last visit? Include any pap smears or colon screening. No     Here today for a routine prenatal visit and she has no complaints or concerns.

## 2019-03-28 ENCOUNTER — ROUTINE PRENATAL (OUTPATIENT)
Dept: OBGYN CLINIC | Age: 34
End: 2019-03-28

## 2019-03-28 VITALS — WEIGHT: 160 LBS | BODY MASS INDEX: 28.34 KG/M2 | DIASTOLIC BLOOD PRESSURE: 72 MMHG | SYSTOLIC BLOOD PRESSURE: 118 MMHG

## 2019-03-28 DIAGNOSIS — M54.31 SCIATIC PAIN, RIGHT: ICD-10-CM

## 2019-03-28 DIAGNOSIS — Z34.83 ENCOUNTER FOR SUPERVISION OF OTHER NORMAL PREGNANCY IN THIRD TRIMESTER: Primary | ICD-10-CM

## 2019-03-28 NOTE — PROGRESS NOTES
S: Feeling well. Complains of persistent right buttocks pain. . Reports consistent, daily fetal mvmt. Denies ctxs, LOF, or vaginal bldng. Den[facility] to [facility] area. Desires children at birth  O: See prenatal flowsheet for maternal and fetal exam  Blood pressure 118/72, weight 160 lb (72.6 kg), last menstrual period 07/15/2018.     A:G6   36w4d   Size=Dates    P: GBS Cx obtained  Referred to PT  Discussed need to have adequate support for children at delivery  See prenatal checklist for other topics covered during today's visit  RTO in 1 weeks for FILEMON with NICKI

## 2019-03-28 NOTE — PROGRESS NOTES
Chief Complaint   Patient presents with    Routine Prenatal Visit     Visit Vitals  /72   Wt 160 lb (72.6 kg)   LMP 07/15/2018   BMI 28.34 kg/m²     1. Have you been to the ER, urgent care clinic since your last visit? Hospitalized since your last visit? No    2. Have you seen or consulted any other health care providers outside of the [address] [place] [address] since your last visit? Include any pap smears or colon screening. No     Here today for a routine prenatal visit and she has no complaints or concerns.

## 2019-03-30 LAB
GP B STREP DNA SPEC QL NAA+PROBE: NEGATIVE
GRBS, EXTERNAL: NEGATIVE

## 2019-04-04 ENCOUNTER — ROUTINE PRENATAL (OUTPATIENT)
Dept: OBGYN CLINIC | Age: 34
End: 2019-04-04

## 2019-04-04 VITALS — BODY MASS INDEX: 28.87 KG/M2 | WEIGHT: 163 LBS | SYSTOLIC BLOOD PRESSURE: 119 MMHG | DIASTOLIC BLOOD PRESSURE: 71 MMHG

## 2019-04-04 DIAGNOSIS — Z34.83 ENCOUNTER FOR SUPERVISION OF OTHER NORMAL PREGNANCY IN THIRD TRIMESTER: Primary | ICD-10-CM

## 2019-04-04 NOTE — PROGRESS NOTES
S: Feeling well. No significant complaints or concerns. Reports consistent, daily fetal mvmt. Denies ctxs, LOF, or vaginal bldng. No s/s cholestasis of pregnancy. Excited since had with last 2 IUPs  Denies travel to [facility] affected area. O: See prenatal flowsheet for maternal and fetal exam.  Blood pressure 119/71, weight 163 lb (73.9 kg), last menstrual period 07/15/2018. A:  37w4d   Size=Dates    P: labor precautions and fkcs stressed. Reviewed labor since never had spont labor with prior births.    See prenatal checklist for other topics covered during today's visit  RTO in 1 weeks for FILEMON with NICKI

## 2019-04-04 NOTE — PROGRESS NOTES
Chief Complaint   Patient presents with    Routine Prenatal Visit     Visit Vitals  /71   Wt 163 lb (73.9 kg)   LMP 07/15/2018   BMI 28.87 kg/m²     1. Have you been to the ER, urgent care clinic since your last visit? Hospitalized since your last visit? No    2. Have you seen or consulted any other health care providers outside of the [address] [place] [address] since your last visit? Include any pap smears or colon screening. No     Here today for a routine prenatal visit and she has no complaints or concerns.

## 2019-04-10 ENCOUNTER — ROUTINE PRENATAL (OUTPATIENT)
Dept: OBGYN CLINIC | Age: 34
End: 2019-04-10

## 2019-04-10 VITALS — SYSTOLIC BLOOD PRESSURE: 125 MMHG | WEIGHT: 162 LBS | DIASTOLIC BLOOD PRESSURE: 80 MMHG | BODY MASS INDEX: 28.7 KG/M2

## 2019-04-10 DIAGNOSIS — L29.9 ITCHING: ICD-10-CM

## 2019-04-10 DIAGNOSIS — Z87.59 HISTORY OF CHOLESTASIS DURING PREGNANCY: Primary | ICD-10-CM

## 2019-04-10 DIAGNOSIS — Z34.83 ENCOUNTER FOR SUPERVISION OF OTHER NORMAL PREGNANCY IN THIRD TRIMESTER: ICD-10-CM

## 2019-04-10 DIAGNOSIS — Z87.19 HISTORY OF CHOLESTASIS DURING PREGNANCY: Primary | ICD-10-CM

## 2019-04-10 NOTE — PROGRESS NOTES
Chief Complaint   Patient presents with    Routine Prenatal Visit     Visit Vitals  /80   Wt 162 lb (73.5 kg)   LMP 07/15/2018   BMI 28.70 kg/m²     1. Have you been to the ER, urgent care clinic since your last visit? Hospitalized since your last visit? No    2. Have you seen or consulted any other health care providers outside of the [address] [place] [address] since your last visit? Include any pap smears or colon screening. No     Here today for a routine prenatal visit and she has no complaints or concerns.

## 2019-04-10 NOTE — PROGRESS NOTES
.S: Feeling well. C/o deep itching for past few days, similar to other pregnancies when she first developed Cholestasis of pregnancy, though not as intense yet. . Reports consistent, daily fetal mvmt. Denies ctxs, LOF, or vaginal bldng. Denies change of soaps, lotions ect. Denies travel to [facility] affected area. O: See prenatal flowsheet for maternal and fetal exam. No rash, skin changes. Cervical exam done. Blood pressure 125/80, weight 162 lb (73.5 kg), last menstrual period 07/15/2018. A:  38w3d   Size=Dates    P: Will start oatmeal compresses in shower for comfort. CMP, bile acids. . Consider IOL if elevated. FKCs stressed  See prenatal checklist for other topics covered during today's visit  RTO in 1 weeks for FILEMON with NICKI

## 2019-04-11 LAB
ALBUMIN SERPL-MCNC: 3.4 G/DL (ref 3.5–5.5)
ALBUMIN/GLOB SERPL: 1.2 {RATIO} (ref 1.2–2.2)
ALP SERPL-CCNC: 191 IU/L (ref 39–117)
ALT SERPL-CCNC: 16 IU/L (ref 0–32)
AST SERPL-CCNC: 25 IU/L (ref 0–40)
BILE AC SERPL-SCNC: 16.4 UMOL/L (ref 4.7–24.5)
BILIRUB SERPL-MCNC: 0.2 MG/DL (ref 0–1.2)
BUN SERPL-MCNC: 7 MG/DL (ref 6–20)
BUN/CREAT SERPL: 9 (ref 9–23)
CALCIUM SERPL-MCNC: 8.9 MG/DL (ref 8.7–10.2)
CHLORIDE SERPL-SCNC: 102 MMOL/L (ref 96–106)
CO2 SERPL-SCNC: 21 MMOL/L (ref 20–29)
CREAT SERPL-MCNC: 0.74 MG/DL (ref 0.57–1)
GLOBULIN SER CALC-MCNC: 2.8 G/DL (ref 1.5–4.5)
GLUCOSE SERPL-MCNC: 56 MG/DL (ref 65–99)
POTASSIUM SERPL-SCNC: 4.5 MMOL/L (ref 3.5–5.2)
PROT SERPL-MCNC: 6.2 G/DL (ref 6–8.5)
SODIUM SERPL-SCNC: 137 MMOL/L (ref 134–144)

## 2019-04-12 ENCOUNTER — HOSPITAL ENCOUNTER (INPATIENT)
Age: 34
LOS: 3 days | Discharge: HOME | DRG: 560 | End: 2019-04-15
Attending: OBSTETRICS & GYNECOLOGY | Admitting: ADVANCED PRACTICE MIDWIFE
Payer: MEDICAID

## 2019-04-12 PROBLEM — K83.1 CHOLESTASIS DURING PREGNANCY IN THIRD TRIMESTER: Status: ACTIVE | Noted: 2019-04-12

## 2019-04-12 PROBLEM — O26.613 CHOLESTASIS DURING PREGNANCY IN THIRD TRIMESTER: Status: ACTIVE | Noted: 2019-04-12

## 2019-04-12 LAB
ERYTHROCYTE [DISTWIDTH] IN BLOOD BY AUTOMATED COUNT: 13.2 % (ref 11.5–14.5)
HCT VFR BLD AUTO: 33.5 % (ref 35–47)
HGB BLD-MCNC: 11.1 G/DL (ref 11.5–16)
MCH RBC QN AUTO: 30.6 PG (ref 26–34)
MCHC RBC AUTO-ENTMCNC: 33.1 G/DL (ref 30–36.5)
MCV RBC AUTO: 92.3 FL (ref 80–99)
NRBC # BLD: 0 K/UL (ref 0–0.01)
NRBC BLD-RTO: 0 PER 100 WBC
PLATELET # BLD AUTO: 196 K/UL (ref 150–400)
PMV BLD AUTO: 12.7 FL (ref 8.9–12.9)
RBC # BLD AUTO: 3.63 M/UL (ref 3.8–5.2)
WBC # BLD AUTO: 7.4 K/UL (ref 3.6–11)

## 2019-04-12 PROCEDURE — 85027 COMPLETE CBC AUTOMATED: CPT

## 2019-04-12 PROCEDURE — 75410000003 HC RECOV DEL/VAG/CSECN EA 0.5 HR: Performed by: ADVANCED PRACTICE MIDWIFE

## 2019-04-12 PROCEDURE — 75410000000 HC DELIVERY VAGINAL/SINGLE: Performed by: ADVANCED PRACTICE MIDWIFE

## 2019-04-12 PROCEDURE — 65270000029 HC RM PRIVATE

## 2019-04-12 PROCEDURE — 36415 COLL VENOUS BLD VENIPUNCTURE: CPT

## 2019-04-12 PROCEDURE — 75410000002 HC LABOR FEE PER 1 HR: Performed by: ADVANCED PRACTICE MIDWIFE

## 2019-04-12 PROCEDURE — 59200 INSERT CERVICAL DILATOR: CPT | Performed by: ADVANCED PRACTICE MIDWIFE

## 2019-04-12 PROCEDURE — 77030028565 HC CATH CERV RIPNG BLN COOK -B

## 2019-04-12 RX ORDER — ACETAMINOPHEN 325 MG/1
650 TABLET ORAL
Status: DISCONTINUED | OUTPATIENT
Start: 2019-04-12 | End: 2019-04-13

## 2019-04-12 RX ORDER — MAG HYDROX/ALUMINUM HYD/SIMETH 200-200-20
30 SUSPENSION, ORAL (FINAL DOSE FORM) ORAL
Status: DISCONTINUED | OUTPATIENT
Start: 2019-04-12 | End: 2019-04-13 | Stop reason: HOSPADM

## 2019-04-12 RX ORDER — ONDANSETRON 2 MG/ML
4 INJECTION INTRAMUSCULAR; INTRAVENOUS
Status: DISCONTINUED | OUTPATIENT
Start: 2019-04-12 | End: 2019-04-13

## 2019-04-12 RX ORDER — RANITIDINE 150 MG/1
150 TABLET, FILM COATED ORAL
COMMUNITY
End: 2019-04-15

## 2019-04-12 NOTE — PROGRESS NOTES
[facility] Labor Progress Note Patient: Laureen Oppenheim MRN: [id number]  SSN: [id number] YOB: 1985  Age: 37 y.o. Sex: female Subjective:  
Patient coping well with contractions. Jean Baptiste catheter came out. Objective:  
Blood pressure 120/67, pulse 88, temperature 98.3 °F (36.8 °C), resp. rate 15, height 5' 3\" (1.6 m), weight 162 lb (73.5 kg), last menstrual period 07/15/2018, not currently breastfeeding. Fetal heart baseline 130 , moderate variability, positive accelerations, negative decelerations, Uterine contractions q 3 minutes, mild  to palpation, resting tone soft, Sterile Vaginal Exam: 4-5cm dilated/50 % effaced/-1 station/BBOW, cervix anterior , fetal presentation vertex, membranes intact Assessment:  
 
38w5d Category 1 fetal heart rate tracing Labor progressing Plan:  
Expectant management Anticipate  Cj Potter CNM

## 2019-04-12 NOTE — PROGRESS NOTES
[id number] - Patient arrived to unit, changing into hospital gown. 1415 - Plan of care reviewed with patient. Lotus Rose at bedside discussing induction process. Patient being induce for cholestasis. Placing on fetal monitor. Will place cervical ripening balloon. [address] catheter placed by Yunior Victoria CNM. 60/60. Per mone Davis to take patient off fetal monitor once reactive and for patient to ambulate in hallway. Will assess need for pitocin later. Patient 1cm/50/-3.  
 
1525 - Fetal HR reactive, taking off fetal monitor 6688 - Patient ambulating off unit with Yunior Victoria. 1640 - Patient back on unit with Yunior Victoria.NICKI. 
 
8526 - Patient called out from restroom, patient states that her mendez bulb came out. 65 - Updated Yunior Victoria CNM that patient's mendez bulb came out. 1830 - Cervical check 4cm per Yunior Victoria, placing on fetal monitor to assess patient's contraction pattern. 1900 - Patient rosalia regularly, will continue to monitor.

## 2019-04-12 NOTE — H&P
History & Physical 
 
Name: Jami Raman MRN: [id number]  SSN: [id number] YOB: 1985  Age: 37 y.o. Sex: female Subjective: 38 yo [id number] woman presents for induction of labor for cholestasis in pregnancy at 45 5/7 weeks gestation. She was accompanied by her  who left to go  the children. Estimated Date of Delivery: 19 OB History  Para Term  AB Living 6 3 3   2 3 SAB TAB Ectopic Molar Multiple Live Births 1         3 # Outcome Date GA Lbr Can/2nd Weight Sex Delivery Anes PTL Lv  
6 Current 5 Term 14 39w4d  7 lb 2 oz (3.233 kg) M VAGINAL DELI None N GIRISH  
4 Term 12 40w3d 07:53 / 00:04 7 lb 10.1 oz (3.46 kg) F VAGINAL DELI None N GIRISH Birth Comments: cholestasis at term, IOL  
3 Term 03/05/10 40w0d 08:00 / 00:15 5 lb 14 oz (2.665 kg) M VAGINAL DELI EPI N GIRISH Birth Comments: ?IUGR/induced   vaginal prostaglandins & Pitocin   MLE  
2 AB 09 6w0d         
1 SAB Ms. Balwinder Duncan is admitted with pregnancy at 38w5d for induction of labor due to cholestasis. Prenatal course was normal.  Please see prenatal records for details. Past Medical History:  
Diagnosis Date  Cholestasis of pregnancy 2014  Cholestasis of pregnancy 2012  Hearing loss 2004 BILATERAL GENETIC HISTORY  Vaginal delivery 2014 Past Surgical History:  
Procedure Laterality Date  HX WISDOM TEETH EXTRACTION    
 as a teenager  INNER EAR SURGERY PROC UNLISTED    
 titaneum stapes d/t hearing loss Social History Occupational History  Occupation: stay at home mom Tobacco Use  Smoking status: Never Smoker  Smokeless tobacco: Never Used Substance and Sexual Activity  Alcohol use: No  
 Drug use: No  
 Sexual activity: Yes  
  Partners: Male Family History Problem Relation Age of Onset [facility] Other Mother BILATERAL HEARING LOSS  Cancer Father skin cancer  Dementia Maternal Grandmother  Other Maternal Grandfather SKIN CA  
 Other Other 2nd pregnancy at 40 weeks  Breast Cancer Paternal Aunt  Other Sister   
     appendectomy  Colon Cancer Neg Hx   
 Ovarian Cancer Neg Hx  Diabetes Neg Hx   
 Heart Disease Neg Hx  Stroke Neg Hx No Known Allergies Prior to Admission medications Medication Sig Start Date End Date Taking? Authorizing Provider  
cholecalciferol, vitamin D3, (VITAMIN D3 PO) Take  by mouth. Provider, Historical  
PNV No12-Iron-FA-DSS-OM-3 29 mg iron-1 mg -50 mg CPKD Take  by mouth. Provider, Historical  
  
 
Review of Systems: A comprehensive review of systems was negative. Objective:  
 
Vitals: There were no vitals filed for this visit. Physical Exam: 
Patient without distress. Heart: Regular rate and rhythm Lung: clear to auscultation throughout lung fields, no wheezes, no rales, no rhonchi and normal respiratory effort Back: costovertebral angle tenderness absent Abdomen: soft, nontender Fundus: soft and non tender Perineum: blood absent, amniotic fluid absent Cervical Exam: 1 cm dilated 50% effaced   
-3 station Presenting Part: cephalic Cervical Position: anterior Consistency: Medium Leal Score: 5 Lower Extremities:  - Edema No 
Membranes:  Intact Fetal Heart Rate: Reactive Baseline: 130's per minute Variability: moderate Accelerations: yes Decelerations: none Uterine contractions: irregular, every 2-6 minutes, mild Prenatal Labs:  
Lab Results Component Value Date/Time  
 Rubella, External Immune 07/11/2013 HBsAg, External Negative 07/11/2013 HIV, External Non-reactive 07/11/2013 RPR, External non-reactive 07/09/2011 05:45 AM  
 Gonorrhea, External Negative 07/04/2013 Chlamydia, External Negative 07/04/2013 ABO,Rh B Positive 07/11/2013 GrBStrep, External NEGATIVE 03/30/2019 Impression/Plan: Active Problems: 
  Cholestasis during pregnancy in third trimester (4/12/2019) Plan: Admit for induction of labor. Group B Strep negative Cook catheter placed Assess contraction pattern and start Pitocin if inadequate Anticipate vaginal delivery

## 2019-04-13 PROBLEM — K83.1 CHOLESTASIS DURING PREGNANCY IN THIRD TRIMESTER: Status: RESOLVED | Noted: 2019-04-12 | Resolved: 2019-04-13

## 2019-04-13 PROBLEM — O26.613 CHOLESTASIS DURING PREGNANCY IN THIRD TRIMESTER: Status: RESOLVED | Noted: 2019-04-12 | Resolved: 2019-04-13

## 2019-04-13 PROCEDURE — 0HQ9XZZ REPAIR PERINEUM SKIN, EXTERNAL APPROACH: ICD-10-PCS | Performed by: OBSTETRICS & GYNECOLOGY

## 2019-04-13 PROCEDURE — 77030018846 HC SOL IRR STRL H20 ICUM -A

## 2019-04-13 PROCEDURE — 65270000029 HC RM PRIVATE

## 2019-04-13 PROCEDURE — 75410000002 HC LABOR FEE PER 1 HR: Performed by: ADVANCED PRACTICE MIDWIFE

## 2019-04-13 PROCEDURE — 10907ZC DRAINAGE OF AMNIOTIC FLUID, THERAPEUTIC FROM PRODUCTS OF CONCEPTION, VIA NATURAL OR ARTIFICIAL OPENING: ICD-10-PCS | Performed by: OBSTETRICS & GYNECOLOGY

## 2019-04-13 PROCEDURE — 74011250636 HC RX REV CODE- 250/636: Performed by: ADVANCED PRACTICE MIDWIFE

## 2019-04-13 PROCEDURE — 74011250636 HC RX REV CODE- 250/636

## 2019-04-13 PROCEDURE — 74011250637 HC RX REV CODE- 250/637: Performed by: ADVANCED PRACTICE MIDWIFE

## 2019-04-13 RX ORDER — OXYTOCIN/0.9 % SODIUM CHLORIDE 30/500 ML
PLASTIC BAG, INJECTION (ML) INTRAVENOUS
Status: COMPLETED
Start: 2019-04-13 | End: 2019-04-13

## 2019-04-13 RX ORDER — LIDOCAINE HYDROCHLORIDE 10 MG/ML
INJECTION INFILTRATION; PERINEURAL
Status: COMPLETED
Start: 2019-04-13 | End: 2019-04-13

## 2019-04-13 RX ORDER — SIMETHICONE 80 MG
80 TABLET,CHEWABLE ORAL
Status: DISCONTINUED | OUTPATIENT
Start: 2019-04-13 | End: 2019-04-15 | Stop reason: HOSPADM

## 2019-04-13 RX ORDER — SWAB
1 SWAB, NON-MEDICATED MISCELLANEOUS DAILY
Status: DISCONTINUED | OUTPATIENT
Start: 2019-04-14 | End: 2019-04-15 | Stop reason: HOSPADM

## 2019-04-13 RX ORDER — ACETAMINOPHEN 325 MG/1
650 TABLET ORAL
Status: DISCONTINUED | OUTPATIENT
Start: 2019-04-13 | End: 2019-04-15 | Stop reason: HOSPADM

## 2019-04-13 RX ORDER — IBUPROFEN 800 MG/1
800 TABLET ORAL EVERY 8 HOURS
Status: DISCONTINUED | OUTPATIENT
Start: 2019-04-13 | End: 2019-04-15 | Stop reason: HOSPADM

## 2019-04-13 RX ORDER — LIDOCAINE HYDROCHLORIDE 10 MG/ML
5 INJECTION INFILTRATION; PERINEURAL ONCE
Status: COMPLETED | OUTPATIENT
Start: 2019-04-13 | End: 2019-04-13

## 2019-04-13 RX ORDER — OXYTOCIN/0.9 % SODIUM CHLORIDE 30/500 ML
0-20 PLASTIC BAG, INJECTION (ML) INTRAVENOUS
Status: DISCONTINUED | OUTPATIENT
Start: 2019-04-13 | End: 2019-04-13

## 2019-04-13 RX ORDER — ONDANSETRON 4 MG/1
4 TABLET, ORALLY DISINTEGRATING ORAL
Status: ACTIVE | OUTPATIENT
Start: 2019-04-13 | End: 2019-04-14

## 2019-04-13 RX ORDER — DIPHENHYDRAMINE HYDROCHLORIDE 50 MG/ML
12.5 INJECTION, SOLUTION INTRAMUSCULAR; INTRAVENOUS
Status: DISCONTINUED | OUTPATIENT
Start: 2019-04-13 | End: 2019-04-15 | Stop reason: HOSPADM

## 2019-04-13 RX ORDER — OXYTOCIN/RINGER'S LACTATE 20/1000 ML
PLASTIC BAG, INJECTION (ML) INTRAVENOUS
Status: COMPLETED
Start: 2019-04-13 | End: 2019-04-13

## 2019-04-13 RX ORDER — SODIUM CHLORIDE 0.9 % (FLUSH) 0.9 %
5-40 SYRINGE (ML) INJECTION EVERY 8 HOURS
Status: DISCONTINUED | OUTPATIENT
Start: 2019-04-13 | End: 2019-04-15 | Stop reason: HOSPADM

## 2019-04-13 RX ORDER — SODIUM CHLORIDE, SODIUM LACTATE, POTASSIUM CHLORIDE, CALCIUM CHLORIDE 600; 310; 30; 20 MG/100ML; MG/100ML; MG/100ML; MG/100ML
125 INJECTION, SOLUTION INTRAVENOUS CONTINUOUS
Status: DISCONTINUED | OUTPATIENT
Start: 2019-04-13 | End: 2019-04-13

## 2019-04-13 RX ORDER — NALOXONE HYDROCHLORIDE 0.4 MG/ML
0.4 INJECTION, SOLUTION INTRAMUSCULAR; INTRAVENOUS; SUBCUTANEOUS AS NEEDED
Status: DISCONTINUED | OUTPATIENT
Start: 2019-04-13 | End: 2019-04-15 | Stop reason: HOSPADM

## 2019-04-13 RX ORDER — HYDROCORTISONE ACETATE PRAMOXINE HCL 2.5; 1 G/100G; G/100G
CREAM TOPICAL AS NEEDED
Status: DISCONTINUED | OUTPATIENT
Start: 2019-04-13 | End: 2019-04-15 | Stop reason: HOSPADM

## 2019-04-13 RX ORDER — OXYTOCIN/0.9 % SODIUM CHLORIDE 30/500 ML
0-25 PLASTIC BAG, INJECTION (ML) INTRAVENOUS
Status: DISCONTINUED | OUTPATIENT
Start: 2019-04-13 | End: 2019-04-15 | Stop reason: HOSPADM

## 2019-04-13 RX ORDER — DOCUSATE SODIUM 100 MG/1
100 CAPSULE, LIQUID FILLED ORAL
Status: DISCONTINUED | OUTPATIENT
Start: 2019-04-13 | End: 2019-04-15 | Stop reason: HOSPADM

## 2019-04-13 RX ORDER — HYDROCODONE BITARTRATE AND ACETAMINOPHEN 5; 325 MG/1; MG/1
1 TABLET ORAL
Status: DISCONTINUED | OUTPATIENT
Start: 2019-04-13 | End: 2019-04-15 | Stop reason: HOSPADM

## 2019-04-13 RX ADMIN — OXYTOCIN-SODIUM CHLORIDE 0.9% IV SOLN 30 UNIT/500ML 999 MILLI-UNITS/MIN: 30-0.9/5 SOLUTION at 16:13

## 2019-04-13 RX ADMIN — ONDANSETRON 4 MG: 2 INJECTION INTRAMUSCULAR; INTRAVENOUS at 14:41

## 2019-04-13 RX ADMIN — OXYTOCIN-SODIUM CHLORIDE 0.9% IV SOLN 30 UNIT/500ML 2 MILLI-UNITS/MIN: 30-0.9/5 SOLUTION at 07:53

## 2019-04-13 RX ADMIN — LIDOCAINE HYDROCHLORIDE 5 ML: 10 INJECTION, SOLUTION INFILTRATION; PERINEURAL at 16:13

## 2019-04-13 RX ADMIN — Medication 20000 MILLI-UNITS: at 16:54

## 2019-04-13 RX ADMIN — LIDOCAINE HYDROCHLORIDE 5 ML: 10 INJECTION INFILTRATION; PERINEURAL at 16:13

## 2019-04-13 RX ADMIN — SODIUM CHLORIDE, SODIUM LACTATE, POTASSIUM CHLORIDE, AND CALCIUM CHLORIDE 125 ML/HR: 600; 310; 30; 20 INJECTION, SOLUTION INTRAVENOUS at 07:28

## 2019-04-13 RX ADMIN — IBUPROFEN 800 MG: 800 TABLET ORAL at 17:16

## 2019-04-13 NOTE — LACTATION NOTE
This note was copied from a baby's chart. This is mother's 4th baby to breastfeed. LC present for baby's first feeding - baby latched on well to right breast and nursed well. LC reviewed the following: Pt will successfully establish breastfeeding by feeding in response to infant's early feeding cues and/or to offer breast every 2-3 hours. Ways to obtain a deep latch and seek comfortable positioning shared, aware to keep log of feedings/output. Encouraged mom to attempt feeding with baby led feeding cues. Just as sucking on fingers, rooting, mouthing. Looking for 8-12 feedings in 24 hours. Don't limit baby at breast, allow baby to come of breast on it's own. Baby may want to feed  often and may increase number of feedings on second day of life. Skin to skin encouraged. If baby doesn't nurse,  Mom should  hand express  10-20 drops of colostrum and drip into baby's mouth, or give to baby by finger feeding, cup feeding, or spoon feeding at least every 2-3 hours. Discussed with mother her plan for feeding. Reviewed the benefits of exclusive breast milk feeding during the hospital stay. Informed her of the risks of using formula to supplement in the first few days of life as well as the benefits of successful breast milk feeding; referred her to the Breastfeeding booklet about this information. She acknowledges understanding of information reviewed and states that it is her plan to breastfeed  her infant. Will support her choice and offer additional information as needed. Hand Expression Education:  Mom taught how to manually hand express her colostrum. Emphasized the importance of providing infant with valuable colostrum as infant rests skin to skin at breast.  Aware to avoid extended periods of non-feeding. Aware to offer 10-20+ drops of colostrum every 2-3 hours until infant is latching and nursing effectively.   Taught the rationale behind this low tech but highly effective evidence based practice. Pt will successfully establish breastfeeding by feeding in response to early feeding cues  
or wake every 3h, will obtain deep latch, and will keep log of feedings/output. Taught to BF at hunger cues and or q 2-3 hrs and to offer 10-20 drops of hand expressed colostrum at any non-feeds. Breast Assessment Left Breast: Medium Left Nipple: Everted, Intact Right Breast: Medium Right Nipple: Everted, Intact Breast- Feeding Assessment Attends Breast-Feeding Classes: No 
Breast-Feeding Experience: Yes(Breast fed 3 other children for 8-12 months) Breast Trauma/Surgery: No 
Type/Quality: Good Lactation Consultant Visits Breast-Feedings: Good (LC present for baby's 1st feeding. Baby latched on well to right breast. ) Mother/Infant Observation Mother Observation: Alignment, Breast comfortable, Close hold, Holds breast, Recognizes feeding cues Infant Observation: Audible swallows, Latches nipple and aereolae, Lips flanged, lower, Rhythmic suck, Feeding cues, Lips flanged, upper, Opens mouth LATCH Documentation Latch: Grasps breast, tongue down, lips flanged, rhythmic sucking Audible Swallowing: A few with stimulation Type of Nipple: Everted (after stimulation) Comfort (Breast/Nipple): Soft/non-tender Hold (Positioning): Full assist, teach one side, mother does other, staff holds LATCH Score: 8

## 2019-04-13 NOTE — L&D DELIVERY NOTE
Delivery Summary    Patient: Roger Call MRN: [id number]  SSN: [id number]    YOB: 1985  Age: 37 y.o. Sex: female       Information for the patient's :  Radha Salmon, Male Isaacsiakilah Payor [[id number]]       Labor Events:    Labor: No    Steroids: None   Cervical Ripening Date/Time:       Cervical Ripening Type: None   Antibiotics During Labor:     Rupture Identifier:      Rupture Date/Time: 2019 5:24 AM   Rupture Type: AROM   Amniotic Fluid Volume: Moderate    Amniotic Fluid Description: Clear    Amniotic Fluid Odor:      Induction: Jean Baptiste Bulb (balloon)       Induction Date/Time: 2019 1:00 PM    Indications for Induction: Other(comment)    Augmentation: Oxytocin;AROM   Augmentation Date/Time: 20195:30 AM   Indications for Augmentation: Ineffective Contraction Pattern   Labor complications: None       Additional complications:        Delivery Events:  Indications For Episiotomy:     Episiotomy: None   Perineal Laceration(s): 1st   Repaired: Yes   Periurethral Laceration Location:      Repaired:     Labial Laceration Location:     Repaired:     Sulcal Laceration Location:     Repaired:     Vaginal Laceration Location:     Repaired:     Cervical Laceration Location:     Repaired:     Repair Suture: Vicryl 2-0   Number of Repair Packets: 1   Estimated Blood Loss (ml):  ml     Delivery Date: 2019    Delivery Time: 4:02 PM  Delivery Type: Vaginal, Spontaneous  Sex:  Male    Gestational Age: 38w7d   Delivery Clinician:  Brenda Bermudez  Living Status: Living   Delivery Location: L&D 211          APGARS  One minute Five minutes Ten minutes   Skin color: 1   1        Heart rate: 2   2        Grimace: 2   2        Muscle tone: 2   2        Breathin   2        Totals: 9   9            Presentation: Vertex    Position: Left Occiput Posterior  Resuscitation Method:  Suctioning-bulb; Tactile Stimulation     Meconium Stained: None      Cord Information: 3 Vessels  Complications: None  Cord around:    Delayed cord clamping? Yes  Cord clamped date/time:2019  4:08 PM  Disposition of Cord Blood: Discard    Blood Gases Sent?: No    Placenta:  Date/Time: 2019  4:13 PM  Removal: Spontaneous      Appearance: Normal;Intact      Measurements:  Birth Weight:        Birth Length:        Head Circumference:        Chest Circumference:       Abdominal Girth: Other Providers:   FRANCISCO JAVIER Conklin;Bull THORNTON LISA Orlinda Auerbach, Midwife;Primary Nurse;Primary  Nurse;Charge Nurse           Group B Strep:   Lab Results   Component Value Date/Time    GrBStrep, External NEGATIVE 2019     Information for the patient's :  Peter Giles, Male Jean-Pierre Barahona [[id number]]   No results found for: ABORH, PCTABR, PCTDIG, BILI, ABORHEXT, ABORH    No results for input(s): PCO2CB, PO2CB, HCO3I, SO2I, IBD, PTEMPI, SPECTI, PHICB, ISITE, IDEV, IALLEN in the last 72 hours. Roni Negrete delivered a live male child over a first degree perineal laceration at 1602 in semi-recumbent postion. Head delivered GAEL and restituted to LOT. With delivery of the shoulders, infant continued turning to ROP. Mother lifted infant to her abdomen where he was dried. He was vigorous and cried spontaneously. When the cord stopped pulsating, it was double clamped and cut by the father. The placenta and membranes delivered spontaneously at 1613. Inspected and grossly intact with a three vessel cord. Uterus firm with minimal bleeding. A first degree perineal laceration was repaired using 1% lidocaine for local anesthesia and 2-O vicryl. Wound approximated and hemostatic. Infant and mother remained skin-to-skin.

## 2019-04-13 NOTE — PROGRESS NOTES
[facility] Labor Progress Note Patient: Kaylee Morales MRN: [id number]  SSN: [id number] YOB: 1985  Age: 37 y.o. Sex: female Subjective:  
Patient coping well with irregular contractions. Sitting in bed.  at bedside. Objective:  
Blood pressure 121/69, pulse 88, temperature 97.6 °F (36.4 °C), resp. rate 14, height 5' 3\" (1.6 m), weight 162 lb (73.5 kg), last menstrual period 07/15/2018, not currently breastfeeding. Fetal heart baseline 130 bpm , moderate variability, positive  accelerations,  negative decelerations, Uterine contractions irregular, mild to palpation, resting tone soft, Sterile Vaginal Exam:deferred. Assessment:  
 
38w6d Category 1 fetal heart rate tracing Inadequate labor pattern Plan:  
Pitocin started Continuous fetal monitoring Anticipate  Estelita Wilks CNM

## 2019-04-13 NOTE — PROGRESS NOTES
[address] care of patient. Pt currently off the floor ambulating. 2030- Assessment complete. Pt states having mild contractions. 2230- NST reactive. Off monitor at this time. Plan to let patient sleep and AROM at 0400. Okay to remain off monitor per [facility]. 2121 [facility] at bedside. SVE unchanged. AROM with clear fluid. 8793- Pt ambulatory off unit with CNM. 0630- Pt back on unit. Pads and underwear changed.

## 2019-04-13 NOTE — PROGRESS NOTES
26 SBAR report from Megha Nicole RN 
 
3628 Bedside assessment completed. Benign assessment. Chucks pad changed with this RN. Pad saturated with large amount of clear, odorless fluid. Patient and SO oriented to room and unit including call bell and emergency cord. 0059 VIOLETTE Oshea at bedside. 8816 VIOLETTE Oshea at bedside, reviewing EFM tracing. Patient repositioned to high fowlers. Patient provided with extra chucks pads and mesh underwear. Patient instructed to call this RN when ready to ambulate with GE monitor. Rivka Altamirano CNM remaining at bedside. 0930 SBAR report given to DARLIN Ruiz RN. Patient ambulating in hallway with GE monitor. . No needs expressed by patient or SO at this time. 400 [facility] report from DARLIN Ruiz RN 
 
9334 FHTs obtained before,during, and after contraction. Rivka Altamirano CNM at bedside. No needs expressed at this time. 1414 Patient back on EFM. Annette Jewell at bedside. 1421 SVE by Rivka Altamirano CNM noted to be 7/90/0. Per midwife, continue EFM to determine if pitocin needed to progress labor. 1430 Patient provided with heating pad. 
 
1455 Patient assisted to shower with this RN and midwife. No needs expressed my patient or SO at this time. 1543 Patient back on EFM. SVE by Rivka Altamirano CNM noted to be 10/+2.  
 
53-69-10-18 Patient educated on how to push effectively. Patient bearing down with contraction. 1602 [place] head out. Midwife assessing for nuchal cord, none noted.  of viable male infant.  placed on maternal abdomen, crying and being stimulated by VANESSA Faria, core nursery RN. 1608 Cord clamped at this time. Midwife remaining at bedside for delivery of placenta. 1613 Spontaneous delivery of placenta. VORB for pitocin bolus. 1614 Uterus firm, -1 with small/moderate bleeding noted. EBL of 75mL per Rivka Altamirano CNM. [address] [facility]. Aileen Pulse at bedside assisting with feeding. 1755 Patient able to void 300cc clear, yellow urine. Pericare performed with partial assistance from this RN. Additional 95mL in blood loss. [address] SBAR OUT Report: Mother Bedside and Verbal report given to Jaden Sheehan RN (full name & credentials) on this patient, who is now being transferred to MIU (unit) for routine progression of care. Report consisted of patient's Situation, Background, Assessment and Recommendations (SBAR). [place] ID bands were compared with the identification form, and verified with the patient and receiving nurse. Information from the SBAR, Kardex, Intake/Output, MAR and Recent Results and the [place] Report was reviewed with the receiving nurse; opportunity for questions and clarification provided.

## 2019-04-13 NOTE — PROGRESS NOTES
9186 Received report from Molly Bejarano RN. Will continue to monitor patient. Patient walking the halls. 1029 Patient in the room. CNM at the bedside. Will continue to monitor. 1049 Patient ambulating [phone] Patient in the tub with CNM in the room. Midwife asked for pitocin to be turned off. Pit off. Will continue to monitor. 1225 CNM took off monitor. Patient is still in tub, CNM in the room.  in bathroom for support [address] by  bpm. 
 
1315 Report given to Molly Bejarano RN.

## 2019-04-13 NOTE — PROGRESS NOTES
[facility] Labor Progress Note Patient: Jami Raman MRN: [id number]  SSN: [id number] YOB: 1985  Age: 37 y.o. Sex: female Subjective:  
Patient slept for 4 hours. She is ready now for AROM for augmentation.  slept on cot in room. Present and supportive Objective:  
Blood pressure 125/66, pulse 82, temperature 98.1 °F (36.7 °C), resp. rate 14, height 5' 3\" (1.6 m), weight 162 lb (73.5 kg), last menstrual period 07/15/2018, not currently breastfeeding. Fetal heart baseline 130's ,  moderate variability, positive accelerations, negative decelerations, Uterine contractions irregular, mild  to palpation, resting tone soft, Sterile Vaginal Exam: 4-5 cm dilated/50% effaced/ -3 station, cervix  midline posterior , fetal presentation vertex, AROM clear fluid Assessment:  
 
38w6d Category 1 fetal heart rate tracing Labor Plan:  
AROM clear fluid Ambulate Start Pitocin in an hour if contractions do not . Anticipate  [place] [facility] Bxo

## 2019-04-14 PROCEDURE — 77030021125

## 2019-04-14 PROCEDURE — 65270000029 HC RM PRIVATE

## 2019-04-14 PROCEDURE — 74011250637 HC RX REV CODE- 250/637: Performed by: ADVANCED PRACTICE MIDWIFE

## 2019-04-14 RX ADMIN — IBUPROFEN 800 MG: 800 TABLET ORAL at 10:52

## 2019-04-14 RX ADMIN — Medication 1 TABLET: at 09:35

## 2019-04-14 RX ADMIN — IBUPROFEN 800 MG: 800 TABLET ORAL at 18:23

## 2019-04-14 RX ADMIN — IBUPROFEN 800 MG: 800 TABLET ORAL at 03:21

## 2019-04-14 NOTE — PROGRESS NOTES
CNMPostpartumNoteDay1 S: Patient ambulating and voiding without difficulty. Patient happy with birth experience. Infant sleepy and not breastfeeding welll. No complaints. O: Vital signs stable, Heart RRR without murmur, Lungs CTA bilaterally, FF below umbilicus, lochia rubra light, breasts soft, nipples intact, no edema, negative Kennedi's sign A: Postpartum Day 1 -  of live male child over first degree laceration Breastfeeding B+/Rubella immune/GBS neg 
  
P: Continue current postpartum orders Lactation consult and support Anticipate discharge tomorrow Offer Tdap if has not had this pregnancy

## 2019-04-14 NOTE — ROUTINE PROCESS
Bedside shift change report given to ENIO Amato RN (oncoming nurse) by Hannah Pyle RN (offgoing nurse). Report included the following information SBAR, Kardex, Intake/Output, MAR and Accordion.

## 2019-04-14 NOTE — ROUTINE PROCESS
Bedside and Verbal shift change report given to ENMA Quinn RN (oncoming nurse) by Brooke Lewis RN (offgoing nurse). Report included the following information SBAR, Kardex, Intake/Output, MAR, Accordion and Recent Results.

## 2019-04-15 VITALS
HEIGHT: 63 IN | WEIGHT: 162 LBS | TEMPERATURE: 98.2 F | RESPIRATION RATE: 16 BRPM | HEART RATE: 65 BPM | BODY MASS INDEX: 28.7 KG/M2 | SYSTOLIC BLOOD PRESSURE: 108 MMHG | DIASTOLIC BLOOD PRESSURE: 53 MMHG

## 2019-04-15 PROBLEM — Z87.59 HISTORY OF CHOLESTASIS DURING PREGNANCY: Status: RESOLVED | Noted: 2018-09-12 | Resolved: 2019-04-15

## 2019-04-15 PROBLEM — Z87.19 HISTORY OF CHOLESTASIS DURING PREGNANCY: Status: RESOLVED | Noted: 2018-09-12 | Resolved: 2019-04-15

## 2019-04-15 PROCEDURE — 74011250637 HC RX REV CODE- 250/637: Performed by: ADVANCED PRACTICE MIDWIFE

## 2019-04-15 RX ORDER — SWAB
1 SWAB, NON-MEDICATED MISCELLANEOUS DAILY
Qty: 100 TAB | Refills: 1 | Status: SHIPPED | OUTPATIENT
Start: 2019-04-15

## 2019-04-15 RX ORDER — IBUPROFEN 800 MG/1
800 TABLET ORAL
Qty: 30 TAB | Refills: 0 | Status: SHIPPED | OUTPATIENT
Start: 2019-04-15 | End: 2019-05-22 | Stop reason: ALTCHOICE

## 2019-04-15 RX ADMIN — Medication 1 TABLET: at 09:15

## 2019-04-15 RX ADMIN — IBUPROFEN 800 MG: 800 TABLET ORAL at 02:55

## 2019-04-15 RX ADMIN — MUPIROCIN: 20 OINTMENT TOPICAL at 11:21

## 2019-04-15 NOTE — PROGRESS NOTES
CNMPostpartumNoteDay2- Discharge S: Patient ambulating and voiding without difficulty. Breastfeeding well. No complaints. Ready to go home. O: Vital signs stable, Heart RRR without murmur, Lungs CTA bilaterally, FF below umbilicus, lochia rubra light, breasts soft, nipples intact, no edema, negative s/s DVT A: Postpartum Day 2- ,  Perineum without issues Breastfeeding well Stable P: Follow routine postpartum discharge instructions Discharge home with baby Ibuprofen OTC up to 800 mg every 8 hours as needed for pain or cramping Continue PNV while breastfeeding Continue stool softner until comfortable with bowel movements Follow-up with CNM in 2 to 6 weeks as directed

## 2019-04-15 NOTE — DISCHARGE INSTRUCTIONS
POSTPARTUM DISCHARGE INSTRUCTIONS       Name:  Jen Ortiz  YOB: 1985  Admission Diagnosis:  Cholestasis during pregnancy in third trimester [O26.613, K83.1]     Discharge Diagnosis:    Problem List as of 4/15/2019 Date Reviewed: 4/15/2019          Codes Class Noted - Resolved    RESOLVED: Postpartum care following vaginal delivery ICD-10-CM: Z39.2  ICD-9-CM: V24.2  2019 - 4/15/2019        RESOLVED: Cholestasis during pregnancy in third trimester ICD-10-CM: O26.613, K83.1  ICD-9-CM: 646.73, 576.8  2019 - 2019        RESOLVED: Low lying placenta nos or without hemorrhage, second trimester ICD-10-CM: O44.42  ICD-9-CM: 641.03  2018 - 2019    Overview Addendum 2019  9:57 AM by Kamar Miller CNM     Repeat ultrasound at 28 weeks  Normal placenta  Normal femur length        MFM US for AMA. Short femur; placenta low-lying, anterior -> rpt 28-32wks   Declines screening or invasive testing.               RESOLVED: History of cholestasis during pregnancy ICD-10-CM: Z87.59, Z87.19  ICD-9-CM: V23.49, V12.79  2018 - 4/15/2019        RESOLVED: Complete miscarriage ICD-10-CM: O03.9  ICD-9-CM: 634.92  2017 - 2018        RESOLVED: Syncope and collapse ICD-10-CM: R55  ICD-9-CM: 780.2  2017 - 2017        RESOLVED: Vaginal bleeding ICD-10-CM: N93.9  ICD-9-CM: 623.8  2017 - 2017        RESOLVED: Threatened  in early pregnancy ICD-10-CM: O20.0  ICD-9-CM: 640.03  2017 - 2017        RESOLVED: Cholestasis of pregnancy ICD-10-CM: O26.619, K83.1  ICD-9-CM: 646.70  2014 - 1/15/2014        RESOLVED: Supervision of normal first pregnancy, antepartum ICD-10-CM: Z34.00  ICD-9-CM: V22.0  2013 - 2019    Overview Addendum 2019  1:10 PM by Nhung Hernandez CNM     Routine midwifery care  G6   Collaborating MD  Centering - no  ARIANE by: LMP/US  Genetic screening declines  Social - [employer] - 3 kids youngest Ron  NOB labs WNL  20 wk anatomy scan 18, [facility] needs repeat in third trimester  Gtt 19  Tdap 19   Flu received  Circ desires  Labor Plans  Medical risk factors:   issues of concern: hx of cholestasis of pregnancy x 2    MFM US for AMA. Short femur; placenta low-lying, anterior -> rpt 28 wks resolved   Declines screening or invasive testing. PP support: MIL lives close, mother will come from [facility]: NFP, condoms               RESOLVED: Cholestasis of pregnancy ICD-10-CM: O26.619, K83.1  ICD-9-CM: 646.70  2012 - 2012        RESOLVED: Supervision of other normal pregnancy ICD-10-CM: Z34.80  ICD-9-CM: V22.1  2011 - 3/15/2012            Attending Physician:  Dacia Boswell CNM    Delivery Type:  Vaginal Childbirth: What To Expect At Home    Your Recovery: Your body will slowly heal in the next few weeks. It is easy to get too tired and overwhelmed during the first weeks after your baby is born. Changes in your hormones can shift your mood without warning. You may find it hard to meet the extra demands on your energy and time. Take it easy on yourself. Follow-up care is a key part of your treatment and safety. Be sure to make and go to all appointments, and call your doctor if you are having problems. It's also a good idea to know your test results and keep a list of the medicines you take. How can you care for yourself at home? Vaginal bleeding and cramps  · After delivery, you will have a bloody discharge from the vagina. This will turn pink within a week and then white or yellow after about 10 days. It may last for 2 to 4 weeks or longer, until the uterus has healed. Use pads instead of tampons until you stop bleeding. · Do not worry if you pass some blood clots, as long as they are smaller than a golf ball. If you have a tear or stitches in your vaginal area, change the pad at least every 4 hours to prevent soreness and infection.   · You may have cramps for the first few days after childbirth. These are normal and occur as the uterus shrinks to normal size. Take an over-the-counter pain medicine, such as acetaminophen (Tylenol), ibuprofen (Advil, Motrin), or naproxen (Aleve), for cramps. Read and follow all instructions on the label. Do not take aspirin, because it can cause more bleeding. Do not take acetaminophen (Tylenol) and other acetaminophen containing medications (i.e. Percocet) at the same time. Breast fullness  · Your breasts may overfill (engorge) in the first few days after delivery. To help milk flow and to relieve pain, warm your breasts in the shower or by using warm, moist towels before nursing. · If you are not nursing, do not put warmth on your breasts or touch your breasts. Wear a tight bra or sports bra and use ice until the fullness goes away. This usually takes 2 to 3 days. · Put ice or a cold pack on your breast after nursing to reduce swelling and pain. Put a thin cloth between the ice and your skin. Activity  · Eat a balanced diet. Do not try to lose weight by cutting calories. Keep taking your prenatal vitamins, or take a multivitamin. · Get as much rest as you can. Try to take naps when your baby sleeps during the day. · Get some exercise every day. But do not do any heavy exercise until your doctor says it is okay. · Wait until you are healed (about 4 to 6 weeks) before you have sexual intercourse. Your doctor will tell you when it is okay to have sex. · Talk to your doctor about birth control. You can get pregnant even before your period returns. Also, you can get pregnant while you are breast-feeding. Mental Health  · Many women get the \"baby blues\" during the first few days after childbirth. You may lose sleep, feel irritable, and cry easily. You may feel happy one minute and sad the next. Hormone changes are one cause of these emotional changes.  Also, the demands of a new baby, along with visits from relatives or other family needs, add to a mother's stress. The \"baby blues\" often peak around the fourth day. Then they ease up in less than 2 weeks. · If your moodiness or anxiety lasts for more than 2 weeks, or if you feel like life is not worth living, you may have postpartum depression. This is different for each mother. Some mothers with serious depression may worry intensely about their infant's well-being. Others may feel distant from their child. Some mothers might even feel that they might harm their baby. A mother may have signs of paranoia, wondering if someone is watching her. · With all the changes in your life, you may not know if you are depressed. Pregnancy sometimes causes changes in how you feel that are similar to the symptoms of depression. · Symptoms of depression include:  · Feeling sad or hopeless and losing interest in daily activities. These are the most common symptoms of depression. · Sleeping too much or not enough. · Feeling tired. You may feel as if you have no energy. · Eating too much or too little. · POSTPARTUM SUPPORT INTERNATIONAL (PSI) offers a Warm line; Chat with the Expert phone sessions; Information and Articles about Pregnancy and Postpartum Mood Disorders; Comprehensive List of Free Support Groups; Knowledgeable local coordinators who will offer support, information, and resources; Guide to Resources on Detectent; Calendar of events in the  mood disorders community; Latest News and Research; and [facility] [address] for [employer]. Remember - You are not alone; You are not to blame; With help, you will be well. 3-389-326-PPD(3567). WWW. POSTPARTUM. NET   · Writing or talking about death, such as writing suicide notes or talking about guns, knives, or pills. Keep the numbers for these national suicide hotlines: 8-854-075-TALK (9-104-246-390-004-6546) and 5-414-PRPNSAE (7-474.965.4781).  If you or someone you know talks about suicide or feeling hopeless, get help right away. Constipation and Hemorrhoids  · Drink plenty of fluids, enough so that your urine is light yellow or clear like water. If you have kidney, heart, or liver disease and have to limit fluids, talk with your doctor before you increase the amount of fluids you drink. · Eat plenty of fiber each day. Have a bran muffin or bran cereal for breakfast, and try eating a piece of fruit for a mid-afternoon snack. · For painful, itchy hemorrhoids, put ice or a cold pack on the area several times a day for 10 minutes at a time. Follow this by putting a warm compress on the area for another 10 to 20 minutes or by sitting in a shallow, warm bath. When should you call for help? Call 911 anytime you think you may need emergency care. For example, call if:  · You are thinking of hurting yourself, your baby, or anyone else. · You passed out (lost consciousness). · You have symptoms of a blood clot in your lung (called a pulmonary embolism). These may include:    · Sudden chest pain. · Trouble breathing. · Coughing up blood. Call your doctor now or seek immediate medical care if:  · You have severe vaginal bleeding. · You are soaking through a pad each hour for 2 or more hours. · Your vaginal bleeding seems to be getting heavier or is still bright red 4 days after delivery. · You are dizzy or lightheaded, or you feel like you may faint. · You are vomiting or cannot keep fluids down. · You have a fever. · You have new or more belly pain. · You pass tissue (not just blood). · Your vaginal discharge smells bad. · Your belly feels tender or full and hard. · Your breasts are continuously painful or red. · You feel sad, anxious, or hopeless for more than a few days. · You have sudden, severe pain in your belly. · You have symptoms of a blood clot in your leg (called a deep vein thrombosis),          such as:  · Pain in your calf, back of the knee, thigh, or groin.   · Redness and swelling in your leg or groin.  · You have symptoms of preeclampsia, such as:  · Sudden swelling of your face, hands, or feet. · New vision problems (such as dimness or blurring). · A severe headache. · Your blood pressure is higher than it should be or rises suddenly. · You have new nausea or vomiting. Watch closely for changes in your health, and be sure to contact your doctor if you have any problems. Additional Information:  Not applicable    These are general instructions for a healthy lifestyle:    No smoking/ No tobacco products/ Avoid exposure to second hand smoke    Surgeon General's Warning:  Quitting smoking now greatly reduces serious risk to your health. Obesity, smoking, and sedentary lifestyle greatly increases your risk for illness    A healthy diet, regular physical exercise & weight monitoring are important for maintaining a healthy lifestyle    Recognize signs and symptoms of STROKE:    F-face looks uneven    A-arms unable to move or move unevenly    S-speech slurred or non-existent    T-time-call 911 as soon as signs and symptoms begin - DO NOT go       back to bed or wait to see if you get better - TIME IS BRAIN. I have had the opportunity to make my options or choices for discharge. I have received and understand these instructions. Share with Women  After Your Baby Is Born: What  to Expect Postpartum  What can I expect in the first few months after my baby is born? Your body and emotions change a lot in the first weeks and months after you give birth. Abdomen: Your abdomen (belly) may still look pregnant for a few weeks. In the first few days, you may have  cramping as your uterus (womb) goes back to its normal size. Vagina: You will have vaginal bleeding for about 4 to 5 days after you give birth that is like a heavy period. You might pass a few clots. The bleeding usually goes away after 2 to 3 weeks, but you may have some vaginal  bleeding or spotting for up to 8 weeks after giving birth. Your vagina may be tender and dry for a few months. Perineum: This is the area between your vagina and your anus (where stool comes out). You may have  soreness in this area for a few weeks, especially if you have a tear or stitches. You can put an ice pack on this  area the first day after birth. After 24 hours, sitting in a warm bath can help with the soreness. If you have  hemorrhoids, you can use witch hazel pads from the drugstore to help the hemorrhoid pain. Breasts: Your milk will come in about 2 to 5 days after you give birth. Your breasts will feel full and tender  as they begin to fill with milk. This is called engorgement. Wearing a tight bra can help ease the aching. Your  nipples may also be sore as they become used to having your baby suckle them. If your baby is latching properly,  the pain will go away after the first few minutes of breastfeeding. Do not pump or express milk to make the  engorgement go away. This will just make more milk come in, and your breasts will stay engorged. It can take 6  to 8 weeks for both you and your baby to become really used to breastfeeding. Bowel and Bladder: You may have some gas pain during the first few weeks. You may be constipated, especially  if you are breastfeeding. You can prevent constipation by drinking plenty of water and eating lots of  fruits and vegetables. In the first few months postpartum, some women leak urine when coughing, sneezing,  or picking up something heavy. You can start doing pelvic muscle exercises (often called Kegel exercises) right  away to strengthen the muscles that control and support your bladder.  Incision: If you had a  birth, it will take a few extra weeks before you are completely healed  from the surgery. Take pain medication as you need it and rest when you can. The outside of your incision (cut)  should heal after 2 to 3 weeks. You may have soreness or numbness at the incision for several months.   Sex: Your body needs time to heal after giving birth. While your hormones are adjusting, you may have less  desire for sex, vaginal dryness, and/or tenderness in your vagina or perineum. It is important tomake time to be  with your partner and share physical touching in ways that you both like, whether or not you are ready to start  having sex. Inmost cases, you can start having vaginal sex when you feel ready and your bleeding has stopped. If you are breastfeeding, you might need to use lubricant. You can get pregnant before you start having periods  again so it is important to use birth control if you do not want to become pregnant right away. Talk with your  health care provider about which method is best for you. Weight: It can take up to 6 months to lose the weight you gained during pregnancy. Because a healthy diet  is so important for breastfeeding, do not diet. Gentle exercise, such as taking walks, can help you start to lose  weight until you can start doing more heavy exercise. Emotions and PostpartumDepression: Women have a wide range of emotions after giving birth. You may  feel excited, happy, exhausted, and depressed all on the same day as you adjust to a newworld, a newbaby, and  a newjob taking care of your baby. Having lots of different feelings is normal.   About 7 in every 10 women will have postpartum blues.  This usually starts about 3 days after the birth  of your baby and can last 1 or 2 weeks. You may cry easily and feel sad, irritable or tired. Postpartum blues  usually go away once you start to get 4 to 5 hours of sleep each night that is not interrupted. 6040-4328/09/$36.00 doi:10.1111/jmwh. 38244 c 2017 by [address] sharewithwomen. org   About 10 to 15 out of every 100 women will have postpartum depression. Postpartum depression usually  starts about 2 months after your baby is born and can last for 6 to 12 months. You may feel very sad, anxious,  or overwhelmed or have mood swings and guilt. You are at higher risk for depression if you have a history of  depression yourself or in your family, had depression during your pregnancy, have a sick baby, and/or have  many stressful things going on in your life. About 1 in 56 women will develop a rare but serious health problem called postpartum psychosis. This  can start anytime in the first weeks after giving birth. Women with postpartum psychosis have severe problems  thinking normally. You may have strange beliefs, hallucinations (see or hear things that arent there) or  paranoia (feel suspicious). If you have a history of bipolar disorder yourself or in your family or have had  psychosis before, you are at higher risk for postpartum psychosis. Call your health care provider right away if you feel very nervous, cannot stop crying, or are having thoughts of  hurting yourself or your baby. What can I do to help me recover and adjust to being a mother? Ask for help. Let other people do the cooking and cleaning. Focus on yourself and your baby. Sleep when your baby sleeps. Your body needs rest to heal.   Get exercise and fresh air. You can take your baby, go by yourself, or walk with your partner or a friend. Take a few minutes every day for yourself, even just to shower and rest for a bit, read, or listen to music. Talk to other mothers. You can join a parents support group or just spend time with other mothers. Make time every day to enjoy your baby. Encourage your partner to do this, too! When do I need to call my health care provider? You have a fever of 100.4°F or above. You soak a pad in an hour or less or have golf-ball sized blood clots or larger. Your  incision or stitches in your vagina become red, swollen, or have pus. Your discharge has a foul odor, especially if you also have pain or tenderness in your abdomen.    You have a severe headache that does not go away with medication or have changes in your vision. You have severe pain, redness, or swelling in the back your legs. You have severe depression, hallucinations, or thoughts of hurting yourself, your baby, or someone else. For More Information  American College ofNurse-Midwives  [employer].  KidsHealth  http://kidshealth.org/en/parents/recovering-delivery. html#  Flesch-Fernando Grade Level: 7.1  Approved November 2016. This handout replaces Motherhood: The Early Days published in Volume 54,  Number 6, November/December 2009. This page may be reproduced for noncommercial use by health care professionals to share with clients. Any  other reproduction is subject to the Journal ofMidwifery&Womens Healths approval.The information and  recommendations appearing on this page are appropriate in most instances, but they are not a substitute  for medical diagnosis. For specific information concerning your personal medical condition, the Journal of  [address] suggests that you consult your health care provider.   134 Volume 62, No. 1, January/February 2017

## 2019-04-15 NOTE — ADT AUTH CERT NOTES
Patient Name Knute Shone 70 Lei [address]anger 
[id number] Sex Female  
1985 Address [address] [place] [id number] Phone [phone] (Home) [phone] (Mobile) *Preferred*  
CSN:  
[id number] Admit Date: Admit Time Room Bed 2019  2:01 PM [address] [[id number]] 01 [[id number]] Attending Providers Provider Pager From To Abida Pham MD  19 Denisse Madrid CNM  17 Male Rae Gonzales is a male infant born on 2021 at 4:02 PM. He weighed 3.275 kg and measured 19 in length. His head circumference was 33 cm at birth. Apgars were 9  and 9 . He has been doing well. 
  
Maternal Data:  
  
Delivery Type: Vaginal, Spontaneous Delivery Resuscitation: Suctioning-bulb; Tactile Stimulation Number of Vessels: 3 Vessels Cord Events: None Meconium Stained:   
  
Information for the patient's mother:  Knute Shone [[id number]] Gestational Age: 38w7d

## 2019-04-15 NOTE — LACTATION NOTE
This note was copied from a baby's chart. Mom putting baby to breast on right, baby's top lip tucked in. Shown how to flanged lips and get a deeper latch. Pt will successfully establish breastfeeding by feeding in response to early feeding cues  
or wake every 3h, will obtain deep latch, and will keep log of feedings/output. Taught to BF at hunger cues and or q 2-3 hrs and to offer 10-20 drops of hand expressed colostrum at any non-feeds. Breast Assessment Left Breast: Medium Left Nipple: Everted, Intact, Tender Right Breast: Medium Right Nipple: Everted, Intact, Tender Breast- Feeding Assessment Attends Breast-Feeding Classes: No 
Breast-Feeding Experience: Yes(Breast fed 3 other children for 8-12 months) Breast Trauma/Surgery: No 
Type/Quality: Good Lactation Consultant Visits Breast-Feedings: Good Mother/Infant Observation Mother Observation: Alignment, Lets baby end feeding, Close hold, Holds breast 
Infant Observation: Audible swallows, Lips flanged, lower, Breast tissue moves, Opens mouth(shown how to get a deeper latch.) LATCH Documentation Latch: Grasps breast, tongue down, lips flanged, rhythmic sucking Audible Swallowing: Spontaneous and intermittent (24 hours old) Type of Nipple: Everted (after stimulation) Comfort (Breast/Nipple): Soft/non-tender Hold (Positioning): Full assist, teach one side, mother does other, staff holds LATCH Score: 9 Encouraged mom to attempt feeding with baby led feeding cues. Just as sucking on fingers, rooting, mouthing. Looking for 8-12 feedings in 24 hours. Don't limit baby at breast, allow baby to come of breast on it's own. Baby may want to feed  often and may increase number of feedings on second day of life. Skin to skin encouraged.   
 
If baby doesn't nurse,  Mom should  hand express  10-20 drops of colostrum and drip into baby's mouth, or give to baby by finger feeding, cup feeding, or spoon feeding at least every 2-3 hours. Mom may  Pump and give infant any expressed milk. If not pumping any milk, mom should contact pediatrician for possible need for supplementation. Breast Feeding Discharge Information Chart shows numerous feedings, void, stool WNL. Discussed Importance of monitoring outputs and feedings on first week of  Breastfeeding. Discussed ways to tell if baby getting enough, ie  Voids and stools, by day 7, baby should have at least  4-6 wet diapers a day, change in color of stool to a seedy yellow, and return to birth wt within 2 weeks with a steady increase after that. .  Follow up with pediatrician visit for weight check in 1-2 days reviewed. Discussed Breast feeding support groups and encouraged to call Warm line number, [phone]  for any breast feeding questions or problems that arise. Please leave a message and let us know what is going on. We will return your call within 24 hours. Feedings Encouraged mom to attempt feeding with baby led feeding cues. Just as sucking on fingers, rooting, mouthing. Looking for 8-12 feedings in 24 hours. Don't limit baby at breast, allow baby to come off breast on it's own. Baby may want to feed  often and may increase number of feedings on second day of life. Skin to skin encouraged. In 4-6 weeks, baby may go though a growth spurt and increase feedings for several days to increase your milk supply. If baby doesn't nurse,  Mom should Pump or hand express drops, 12-18 drops, and give infant any expressed milk. If not pumping any milk, mom should contact pediatrician for possible need for supplementation. MOM's DIET Discussed eating a healthy diet. Instructed mother to eat a variety of foods in order to get a well balanced diet.  She should consume an extra 300-500 calories per day (more than her non-pregnant requirement.) These extra calories will help provide energy needed for optimal breast milk production. Mother also encouraged to \"drink to thirst\" and it is recommended that she drink fluids such as water and fruit/vegetable juice. Nutritious snacks should be available so that she can eat throughout the day to help satisfy her hunger and maintain a good milk supply. Continue taking your Prenatal vitamins as long as you breast feed. Engorgement Care Guidelines:  Anticipatory guidance shared. If breast become engorged, to help decrease engorgement. Frequent breastfeeding encouraged, cool packs around breast after nursing may help. May take motrin or Ibuprofen as ordered by your Doctor. Call your doctor, midwife and/or lactation consultant if:  
? Baby is having no wet or dirty diapers ? Baby has dark colored urine after day 3 
(should be pale yellow to clear) ? Baby has dark colored stools after day 4 (should be mustard yellow, with no meconium) ? Baby has fewer wet/soiled diapers or nurses less  
frequently than the goals listed here ? Mom has symptoms of mastitis  
(sore breast with fever, chills, flu-like aching)

## 2019-04-15 NOTE — ROUTINE PROCESS
Bedside and Verbal shift change report given to [address] (oncoming nurse) by Kiera Fontaine RN (offgoing nurse). Report included the following information SBAR, Kardex, Intake/Output, MAR and Accordion.

## 2019-05-22 ENCOUNTER — OFFICE VISIT (OUTPATIENT)
Dept: OBGYN CLINIC | Age: 34
End: 2019-05-22

## 2019-05-22 VITALS — WEIGHT: 144 LBS | BODY MASS INDEX: 25.51 KG/M2 | SYSTOLIC BLOOD PRESSURE: 122 MMHG | DIASTOLIC BLOOD PRESSURE: 63 MMHG

## 2019-05-22 NOTE — PATIENT INSTRUCTIONS
Exercise to decrease Diastasis Recti    The following exercises may be helpful in decreasing diastasis. When in doubt about intensity, take the easier road :)  · Core contraction - In a seated position, place both hands on abdominal muscles. Take small controlled breaths. Slowly contract the abdominal muscles, pulling them straight back towards the spine. Hold the contraction for 30 seconds, while maintaining the controlled breathing. Complete 10 repetitions. · Seated squeeze - Again in a seated position, place one hand above the belly button, and the other below the belly button. With controlled breaths, with a mid-way starting point, pull the abdominals back toward the spine, hold for 2 seconds and return to the mid-way point. Complete 100 repetitions. · Head lift - In a lying down position, knees bent at 90° angle, feet flat, slowly lift the head, chin toward your chest, (concentrate on isolation of the abdominals to prevent hip-flexors from being engaged), slowly contract abdominals toward floor, hold for two seconds, lower head to starting position for 2 seconds. Complete 10 repetitions. · Upright push-up - A standup pushup against the wall, with feet together arms-length away from wall, place hands flat against the wall, contract abdominal muscles toward spine, lean body towards wall, with elbows bent downward close to body, pull abdominal muscles in further, with controlled breathing. Release muscles as you push back to starting position. Complete 20 repetitions. · Squat against the wall - Also known as a seated squat, stand with back against the wall, feet out in front of body, slowly lower body to a seated position so knees are bent at a 90° angle, rosalia abs toward spine as you raise body back to standing position. Optionally, this exercise can also be done using an exercise ball placed against the wall and your lower back. Complete 20 Repetitions.   Squat with squeeze - A variation to the [place] against the wall\" is to place a small resistance ball between the knees, and squeeze the ball as you lower your body to the seated position. Complete 20 repetitions.'  Thank you for choosing [facility]. We know you have many options when it comes to your healthcare; we appreciate that you picked us. Our goal is to provide exceptional service and world class care for every patient. You may receive a survey in the mail or by email asking for your feedback. Please take a few minutes to share your thoughts about your recent visit. Your comments helps us understand what we do well and what we can do better. To ensure confidentiality, this survey is administered by an independent third-party, [facility] participation will help us to continue and improve the quality of care that we provide to you, your family, friends, and neighbors. Thank you!

## 2019-05-22 NOTE — PROGRESS NOTES
6 week postpartum visit    Date of delivery: 2019  Type of delivery:   Anesthesia:none  Provider: Terrance Powers  Laceration:1st lac  Baby's name:Smooth  [de-identified] weight:7lbs 3 oz  Prenatal complications:Cholestasis of pregnancy  Labor/delivery complications:none  Last pap:    Lochia: stopped 4 weeks  LMP: none  [place] score:0/30    Feelings about birth: was hoping didn't have ICP, other than that positive  Feeding method: Breast Problems: tongue tie  Contraception plans: Condoms until IUD    Physical Exam:  See vital signs    General   alert, no distress   Thyroid not enlarged, symmetric, no tenderness/mass/nodules   Lungs   clear to auscultation bilaterally   Breasts  no masses, tenderness, nipples intact   Heart  regular rate and rhythm, S1, S2 normal, no murmur, click, rub or gallop   Abdomen   soft, non-tender. No masses,  No organomegaly. Diastasis Recti 1 cm   Pelvic exam:   VULVA: normal appearing vulva with no masses, tenderness or lesions; well-healed, few suture at lace site. VAGINA: normal appearing vagina with normal color and discharge, no lesions  CERVIX: normal appearing cervix without discharge or lesions   UTERUS: uterus is normal size, shape, consistency and nontender   ADNEXA: normal adnexa in size, nontender and no masses  RECTAL: normal rectal, no masses. Assessment:  Normal 6 week postpartum visit    Plan:   f/u in 3-4 weeks for IUD placement, condoms at every contact until then. .   Annual with pap due   May resume normal work activities  [employer] and exercise

## 2019-05-22 NOTE — PROGRESS NOTES
Chief Complaint   Patient presents with   [address]     Visit Vitals  /63   Wt 144 lb (65.3 kg)   Breastfeeding? Yes   BMI 25.51 kg/m²     1. Have you been to the ER, urgent care clinic since your last visit? Hospitalized since your last visit? No    2. Have you seen or consulted any other health care providers outside of the [address] [place] [address] since your last visit? Include any pap smears or colon screening. No     Here today for post partum care. She has no complaints.

## 2019-06-10 ENCOUNTER — OFFICE VISIT (OUTPATIENT)
Dept: OBGYN CLINIC | Age: 34
End: 2019-06-10

## 2019-06-10 VITALS — WEIGHT: 146 LBS | SYSTOLIC BLOOD PRESSURE: 126 MMHG | BODY MASS INDEX: 25.86 KG/M2 | DIASTOLIC BLOOD PRESSURE: 82 MMHG

## 2019-06-10 DIAGNOSIS — Z30.430 ENCOUNTER FOR IUD INSERTION: Primary | ICD-10-CM

## 2019-06-10 LAB
HCG URINE, QL. (POC): NEGATIVE
VALID INTERNAL CONTROL?: YES

## 2019-06-10 NOTE — PROCEDURES
-----------------------------------IUD INSERTION----------------------------------------- Indications:  Jodie Balderas is a ,  37 y.o. female [facility] whose No LMP prior to pregnancy  was normal in duration and amount of flow. who presents for insertion of an IUD. The risks, benefits and alternatives of IUD insertion were discussed in detail at last visit. She also has reviewed Mirena information. She has elected to proceed with the insertion today and she states she has no further questions. A urine pregnancy test was negative No components found for: SPEP, UPEP    Procedure: The pelvic exam revealed normal external genitalia. On bimanual exam the uterus was midposition and normal in size with no tenderness present. A speculum was inserted into the vagina and the cervix was visualized. The cervix was prepped with zephiran solution. The anterior lip of the cervix was not grasped with a single toothed tenaculum. The uterus was sounded with a Mas sound to 7 centimeters. A Mirena was then inserted without difficulty. The string was cut to 3 centimeters. She experienced a no  amount of cramping. Post Procedure Status: She tolerated the procedure with no discomfort. The patient was observed for 5 minutes after the insertion. There were no complications. Patient was discharged in stable condition.   The patient received Mirena lot number [id number]

## 2019-06-10 NOTE — PROGRESS NOTES
Chief Complaint   Patient presents with    Insertion Iud     Visit Vitals  /82   Wt 146 lb (66.2 kg)   Breastfeeding? Yes   BMI 25.86 kg/m²     1. Have you been to the ER, urgent care clinic since your last visit? Hospitalized since your last visit? No    2. Have you seen or consulted any other health care providers outside of the [address] [place] [address] since your last visit? Include any pap smears or colon screening.  No     Here today for iud placement    [facility]  OFFICE PROCEDURE PROGRESS NOTE        Chart reviewed for the following:   I, Malina Delarosa RN, have reviewed the History, Physical and updated the Allergic reactions for [address] [place] [address] Street performed immediately prior to start of procedure:   Duc Bee RN, have performed the following reviews on Viann Hidden prior to the start of the procedure:            * Patient was identified by name and date of birth   * Agreement on procedure being performed was verified  * Risks and Benefits explained to the patient  * Procedure site verified and marked as necessary  * Patient was positioned for comfort  * Consent was signed and verified     Time: 1020      Date of procedure: 6/10/2019    Procedure performed by:  Brooke Segovia CNM    Provider assisted by: Tameka Keenan CNM    Patient assisted by: self    How tolerated by patient: tolerated the procedure well with no complications    Post Procedural Pain Scale: 2 - Hurts Little Bit    Comments: none

## 2019-06-10 NOTE — PROGRESS NOTES
S: Young Cee presents today for Mirena IUD insertion. She has not had unprotected intercourse in the last 2 weeks. She has reviewed risks and benefits of of the Mirena. O:   Visit Vitals  /82   Wt 146 lb (66.2 kg)   Breastfeeding? Yes   BMI 25.86 kg/m²     Genitalia normal, cervix normal via palpation and visualization with speculum. See procedure note for more information. A:   female at 8 weeks postpartum   Desires Mirena IUD insertion. P:  Successful Insertion of Mirena IUD  Return in 6-10 weeks for string check and follow up.

## 2022-03-22 NOTE — DISCHARGE SUMMARY
Obstetrical Discharge Summary     Name: Jordan Altamirano MRN: [id number]  SSN: [id number]    YOB: 1985  Age: 37 y.o. Sex: female      Allergies: Patient has no known allergies. Admit Date: 2019    Discharge Date: 4/15/2019     Admitting Physician: Joseph Dupree CNM     Attending Physician:  Abrahan David CNM     * Admission Diagnoses: Cholestasis during pregnancy in third trimester [O26.613, K83.1]    * Discharge Diagnoses:   Information for the patient's :  Ericka Block Male Ashlee Albino [[id number]]   Delivery of a 7 lb 3.5 oz (3.275 kg) male infant via Vaginal, Spontaneous on 2019 at 4:02 PM  by . Apgars were 9 and 9.        Additional Diagnoses:   Hospital Problems as of 4/15/2019 Date Reviewed: 4/15/2019          Codes Class Noted - Resolved POA    RESOLVED: Postpartum care following vaginal delivery ICD-10-CM: Z39.2  ICD-9-CM: V24.2  2019 - 4/15/2019 Unknown        RESOLVED: Cholestasis during pregnancy in third trimester ICD-10-CM: O26.613, K83.1  ICD-9-CM: 646.73, 576.8  2019 - 2019 Yes             Lab Results   Component Value Date/Time    Rubella, External Immune 2018    GrBStrep, External NEGATIVE 2019    ABO,Rh B Positive 2013      Immunization History   Administered Date(s) Administered    Influenza Vaccine (Quad) PF 2018    Influenza Vaccine PF 01/15/2014    Influenza Vaccine Split 10/25/2011    Tdap 01/15/2014, 2019       * Procedures:    with 1st repaired  * No surgery found *      [place]  Depression Scale  I have been able to laugh and see the funny side of things: As much as I always could  I have looked forward with enjoyment to things: As much as I ever did  I have blamed myself unnecessarily when things went wrong: Not very often  I have been anxious or worried for no good reason: Hardly ever  I have felt scared or panicky for no very good reason: No, not at all  Things have been getting on top of me: No, most of the time I have coped quite well  I have been so unhappy that I have had difficulty sleeping: No, not at all  I have felt sad or miserable: No, not at all  I have been so unhappy that I have been crying: No, never  The thought of harming myself has occurred to me: Never  Total Score: 3    * Discharge Condition: good    * Hospital Course: Normal hospital course following the delivery. * Disposition: Home    Discharge Medications:   Current Discharge Medication List      START taking these medications    Details   ibuprofen (MOTRIN) 800 mg tablet Take 1 Tab by mouth every eight (8) hours as needed for Pain for up to 30 doses. Indications: Pain  Qty: 30 Tab, Refills: 0      prenatal vit-iron fumarate-fa (PRENATAL PLUS WITH IRON) 28 mg iron- 800 mcg tab Take 1 Tab by mouth daily. Indications: a mother who is producing milk and breastfeeding  Qty: 100 Tab, Refills: 1         STOP taking these medications       raNITIdine (ZANTAC) 150 mg tablet Comments:   Reason for Stopping:               * Follow-up Care/Patient Instructions:   Activity: No sex for 6 weeks and No heavy lifting for 6 weeks  Diet: Regular Diet  Wound Care: Keep wound clean and dry    Follow-up Information     Follow up With Specialties Details Why Contact Info    midwives   6 week post partum FILEMON midwives High Dose Vitamin A Pregnancy And Lactation Text: High dose vitamin A therapy is contraindicated during pregnancy and breast feeding.